# Patient Record
Sex: MALE | Race: WHITE | Employment: FULL TIME | URBAN - NONMETROPOLITAN AREA
[De-identification: names, ages, dates, MRNs, and addresses within clinical notes are randomized per-mention and may not be internally consistent; named-entity substitution may affect disease eponyms.]

---

## 2020-10-05 ENCOUNTER — OFFICE VISIT (OUTPATIENT)
Dept: CARDIOLOGY CLINIC | Age: 65
End: 2020-10-05
Payer: COMMERCIAL

## 2020-10-05 VITALS
DIASTOLIC BLOOD PRESSURE: 86 MMHG | HEIGHT: 76 IN | SYSTOLIC BLOOD PRESSURE: 138 MMHG | BODY MASS INDEX: 34.34 KG/M2 | HEART RATE: 44 BPM | WEIGHT: 282 LBS

## 2020-10-05 PROBLEM — R00.1 BRADYCARDIA: Status: ACTIVE | Noted: 2020-10-05

## 2020-10-05 PROBLEM — Z87.898 HISTORY OF SYNCOPE: Status: ACTIVE | Noted: 2020-10-05

## 2020-10-05 PROBLEM — I45.2 RBBB (RIGHT BUNDLE BRANCH BLOCK WITH LEFT ANTERIOR FASCICULAR BLOCK): Status: ACTIVE | Noted: 2020-10-05

## 2020-10-05 PROBLEM — I44.1 AV BLOCK, 2ND DEGREE: Status: ACTIVE | Noted: 2020-10-05

## 2020-10-05 PROBLEM — R42 POSTURAL DIZZINESS: Status: ACTIVE | Noted: 2020-10-05

## 2020-10-05 PROBLEM — I10 ESSENTIAL HYPERTENSION: Status: ACTIVE | Noted: 2020-10-05

## 2020-10-05 PROBLEM — E78.00 PURE HYPERCHOLESTEROLEMIA: Status: ACTIVE | Noted: 2020-10-05

## 2020-10-05 PROCEDURE — G8427 DOCREV CUR MEDS BY ELIG CLIN: HCPCS | Performed by: INTERNAL MEDICINE

## 2020-10-05 PROCEDURE — 3017F COLORECTAL CA SCREEN DOC REV: CPT | Performed by: INTERNAL MEDICINE

## 2020-10-05 PROCEDURE — 99204 OFFICE O/P NEW MOD 45 MIN: CPT | Performed by: INTERNAL MEDICINE

## 2020-10-05 PROCEDURE — 93000 ELECTROCARDIOGRAM COMPLETE: CPT | Performed by: INTERNAL MEDICINE

## 2020-10-05 PROCEDURE — G8484 FLU IMMUNIZE NO ADMIN: HCPCS | Performed by: INTERNAL MEDICINE

## 2020-10-05 PROCEDURE — G8417 CALC BMI ABV UP PARAM F/U: HCPCS | Performed by: INTERNAL MEDICINE

## 2020-10-05 PROCEDURE — 1036F TOBACCO NON-USER: CPT | Performed by: INTERNAL MEDICINE

## 2020-10-05 RX ORDER — ATORVASTATIN CALCIUM 20 MG/1
20 TABLET, FILM COATED ORAL DAILY
COMMUNITY
Start: 2020-10-03

## 2020-10-05 RX ORDER — LISINOPRIL 5 MG/1
5 TABLET ORAL DAILY
COMMUNITY
Start: 2020-09-28 | End: 2022-10-25

## 2020-10-05 NOTE — PROGRESS NOTES
Chief Complaint   Patient presents with    New Patient    Bradycardia   New patient referred for bradycardia. EKG done today. Denied cp, palpitation or edema    Sob on exertion- in the last few months    Occasional dizziness on getting up fast- for years    Had syncope-late July 2020  predrome-NOTHING SIGNIFICANT  VERY SHORT LASTING dizziness  NO Injury  Did not seek medical advise    postdrome- NONE  No symptom of nause, or sweating or feeling tired  WAKE UP FROM SYNCOPE AND BACK TO BASELINE  Mid august slipped and fall with no LOC, was on pain medication    Hx of Bradycardia and rate of 33  BPM noted at a home    Had cardiac cath in [place] 5 yrs back and known to have LBBB then as well    nevere smoked    Factor V lieden and Hx of DVT 10 yrs back- and on [facility]  Mother had CHF,  None for cAD or pacer need    Patient Active Problem List   Diagnosis    AV block, 2nd degree with 2: 1 AV block    RBBB (right bundle branch block with left anterior fascicular block)    Bradycardia    History of syncope    Postural dizziness    Essential hypertension    Pure hypercholesterolemia       Past Surgical History:   Procedure Laterality Date    GALLBLADDER SURGERY      PROSTATE SURGERY  2015       No Known Allergies     History reviewed. No pertinent family history.      Social History     Socioeconomic History    Marital status: Unknown     Spouse name: Not on file    Number of children: Not on file    Years of education: Not on file    Highest education level: Not on file   Occupational History    Not on file   Social Needs    Financial resource strain: Not on file    Food insecurity     Worry: Not on file     Inability: Not on file    Transportation needs     Medical: Not on file     Non-medical: Not on file   Tobacco Use    Smoking status: Never Smoker    Smokeless tobacco: Never Used   Substance and Sexual Activity    Alcohol use: Not on file    Drug use: Not on file    Sexual activity: Not on file   Lifestyle    Physical activity     Days per week: Not on file     Minutes per session: Not on file    Stress: Not on file   Relationships    Social connections     Talks on phone: Not on file     Gets together: Not on file     Attends [employer] service: Not on file     Active member of club or organization: Not on file     Attends meetings of clubs or organizations: Not on file     Relationship status: Not on file    Intimate partner violence     Fear of current or ex partner: Not on file     Emotionally abused: Not on file     Physically abused: Not on file     Forced sexual activity: Not on file   Other Topics Concern    Not on file   Social History Narrative    Not on file       Current Outpatient Medications   Medication Sig Dispense Refill    rivaroxaban (XARELTO) 20 MG TABS tablet Take by mouth      atorvastatin (LIPITOR) 20 MG tablet       lisinopril (PRINIVIL;ZESTRIL) 5 MG tablet Take by mouth       No current facility-administered medications for this visit. Review of Systems -     General ROS: negative  Psychological ROS: negative  Hematological and Lymphatic ROS: No history of blood clots or bleeding disorder. Respiratory ROS: no cough,  or wheezing, the rest see HPI  Cardiovascular ROS: See HPI  Gastrointestinal ROS: negative  Genito-Urinary ROS: no dysuria, trouble voiding, or hematuria  Musculoskeletal ROS: negative  Neurological ROS: no TIA or stroke symptoms  Dermatological ROS: negative      Blood pressure 138/86, pulse (!) 44, height 6' 4\" (1.93 m), weight 282 lb (127.9 kg).         Physical Examination:    General appearance - alert, well appearing, and in no distress  HEENT- Pink conjunctiva  , Non-icteri sclera,PERRLA  Mental status - alert, oriented to person, place, and time  Neck - supple, no significant adenopathy, no JVD, or carotid bruits  Chest - clear to auscultation, no wheezes, rales or rhonchi, symmetric air entry  Heart - normal rate, regular rhythm, normal S1, S2, no murmurs, rubs, clicks or gallops  Abdomen - soft, nontender, nondistended, no masses or organomegaly  TERRY- no CVA or flank tenderness, no suprapubic tenderness  Neurological - alert, oriented, normal speech, no focal findings or movement disorder noted  Musculoskeletal/limbs - no joint tenderness, deformity or swelling   - peripheral pulses normal, no pedal edema, no clubbing or cyanosis  Skin - normal coloration and turgor, no rashes, no suspicious skin lesions noted  Psych- appropriate mood and affect    Lab  No results for input(s): CKTOTAL, CKMB, CKMBINDEX, TROPONINI in the last 72 hours. CBC: No results found for: WBC, RBC, HGB, HCT, MCV, MCH, MCHC, RDW, PLT, MPV  BMP:  No results found for: NA, K, CL, CO2, BUN, LABALBU, CREATININE, CALCIUM, GFRAA, LABGLOM, GLUCOSE  Hepatic Function Panel:  No results found for: ALKPHOS, ALT, AST, PROT, BILITOT, BILIDIR, IBILI, LABALBU  Magnesium:  No results found for: MG  Warfarin PT/INR:  No components found for: PTPATWAR, PTINRWAR  HgBA1c:  No results found for: LABA1C  FLP:  No results found for: TRIG, HDL, LDLCALC, LDLDIRECT, LABVLDL  TSH:  No results found for: TSH      ekg 10/5/2020  NSR, 2: 1 AV block, RBBB, RUTHANN, Bifascicular block, QRS duration 166 msec    Assessment    SYMPTOMATIC bradyarrhythmia WITH 2ND DEGREE av BLOCK AND BIFASCICULAR BLOCK) AND RECENT HX OF SYNCOPE AND INTERMITTENT DIZZINESS      Diagnosis Orders   1. AV block, 2nd degree with 2: 1 AV block  ECHO Complete 2D W Doppler W Color    INSERT / REPLACE / REMOVE PACEMAKER   2. RBBB (right bundle branch block with left anterior fascicular block)  ECHO Complete 2D W Doppler W Color    INSERT / REPLACE / REMOVE PACEMAKER   3. Bradycardia  EKG 12 Lead    ECHO Complete 2D W Doppler W Color    INSERT / REPLACE / REMOVE PACEMAKER   4. History of syncope  ECHO Complete 2D W Doppler W Color    INSERT / REPLACE / REMOVE PACEMAKER   5.  Postural dizziness  ECHO Complete 2D W Doppler W Color

## 2020-10-06 ENCOUNTER — HOSPITAL ENCOUNTER (OUTPATIENT)
Dept: NON INVASIVE DIAGNOSTICS | Age: 65
Discharge: HOME | End: 2020-10-06
Payer: COMMERCIAL

## 2020-10-06 LAB
LV EF: 60 %
LVEF MODALITY: NORMAL

## 2020-10-06 PROCEDURE — 93306 TTE W/DOPPLER COMPLETE: CPT

## 2020-10-08 ENCOUNTER — HOSPITAL ENCOUNTER (OUTPATIENT)
Dept: INPATIENT UNIT | Age: 65
Discharge: HOME | End: 2020-10-08
Attending: INTERNAL MEDICINE | Admitting: INTERNAL MEDICINE
Payer: COMMERCIAL

## 2020-10-08 ENCOUNTER — APPOINTMENT (OUTPATIENT)
Dept: GENERAL RADIOLOGY | Age: 65
End: 2020-10-08
Attending: INTERNAL MEDICINE
Payer: COMMERCIAL

## 2020-10-08 VITALS
HEIGHT: 76 IN | SYSTOLIC BLOOD PRESSURE: 159 MMHG | BODY MASS INDEX: 34.1 KG/M2 | WEIGHT: 280 LBS | HEART RATE: 70 BPM | OXYGEN SATURATION: 95 % | DIASTOLIC BLOOD PRESSURE: 78 MMHG | TEMPERATURE: 97.9 F | RESPIRATION RATE: 16 BRPM

## 2020-10-08 LAB
ANION GAP SERPL CALCULATED.3IONS-SCNC: 8 MEQ/L (ref 8–16)
APTT: 30.7 SECONDS (ref 22–38)
BUN BLDV-MCNC: 21 MG/DL (ref 7–22)
CALCIUM SERPL-MCNC: 9.6 MG/DL (ref 8.5–10.5)
CHLORIDE BLD-SCNC: 106 MEQ/L (ref 98–111)
CO2: 27 MEQ/L (ref 23–33)
CREAT SERPL-MCNC: 1.1 MG/DL (ref 0.4–1.2)
EKG ATRIAL RATE: 60 BPM
EKG P AXIS: 41 DEGREES
EKG P-R INTERVAL: 180 MS
EKG Q-T INTERVAL: 538 MS
EKG QRS DURATION: 174 MS
EKG QTC CALCULATION (BAZETT): 392 MS
EKG R AXIS: -65 DEGREES
EKG T AXIS: -40 DEGREES
EKG VENTRICULAR RATE: 32 BPM
ERYTHROCYTE [DISTWIDTH] IN BLOOD BY AUTOMATED COUNT: 14.4 % (ref 11.5–14.5)
ERYTHROCYTE [DISTWIDTH] IN BLOOD BY AUTOMATED COUNT: 45.4 FL (ref 35–45)
GFR SERPL CREATININE-BSD FRML MDRD: 68 ML/MIN/1.73M2
GLUCOSE BLD-MCNC: 103 MG/DL (ref 70–108)
HCT VFR BLD CALC: 49.4 % (ref 42–52)
HEMOGLOBIN: 15.5 GM/DL (ref 14–18)
INR BLD: 1.02 (ref 0.85–1.13)
MCH RBC QN AUTO: 27.2 PG (ref 26–33)
MCHC RBC AUTO-ENTMCNC: 31.4 GM/DL (ref 32.2–35.5)
MCV RBC AUTO: 86.7 FL (ref 80–94)
PLATELET # BLD: 209 THOU/MM3 (ref 130–400)
PMV BLD AUTO: 10.8 FL (ref 9.4–12.4)
POTASSIUM REFLEX MAGNESIUM: 5 MEQ/L (ref 3.5–5.2)
RBC # BLD: 5.7 MILL/MM3 (ref 4.7–6.1)
SODIUM BLD-SCNC: 141 MEQ/L (ref 135–145)
WBC # BLD: 7.7 THOU/MM3 (ref 4.8–10.8)

## 2020-10-08 PROCEDURE — 71045 X-RAY EXAM CHEST 1 VIEW: CPT

## 2020-10-08 PROCEDURE — 85730 THROMBOPLASTIN TIME PARTIAL: CPT

## 2020-10-08 PROCEDURE — 2500000003 HC RX 250 WO HCPCS

## 2020-10-08 PROCEDURE — 6360000002 HC RX W HCPCS

## 2020-10-08 PROCEDURE — C1898 LEAD, PMKR, OTHER THAN TRANS: HCPCS

## 2020-10-08 PROCEDURE — 85610 PROTHROMBIN TIME: CPT

## 2020-10-08 PROCEDURE — 6360000002 HC RX W HCPCS: Performed by: INTERNAL MEDICINE

## 2020-10-08 PROCEDURE — 2709999900 HC NON-CHARGEABLE SUPPLY

## 2020-10-08 PROCEDURE — 2580000003 HC RX 258: Performed by: INTERNAL MEDICINE

## 2020-10-08 PROCEDURE — 36415 COLL VENOUS BLD VENIPUNCTURE: CPT

## 2020-10-08 PROCEDURE — 80048 BASIC METABOLIC PNL TOTAL CA: CPT

## 2020-10-08 PROCEDURE — C1785 PMKR, DUAL, RATE-RESP: HCPCS

## 2020-10-08 PROCEDURE — 85027 COMPLETE CBC AUTOMATED: CPT

## 2020-10-08 PROCEDURE — 93010 ELECTROCARDIOGRAM REPORT: CPT | Performed by: INTERNAL MEDICINE

## 2020-10-08 PROCEDURE — C1894 INTRO/SHEATH, NON-LASER: HCPCS

## 2020-10-08 PROCEDURE — 93005 ELECTROCARDIOGRAM TRACING: CPT | Performed by: INTERNAL MEDICINE

## 2020-10-08 PROCEDURE — 33208 INSRT HEART PM ATRIAL & VENT: CPT | Performed by: INTERNAL MEDICINE

## 2020-10-08 RX ORDER — SODIUM CHLORIDE 9 MG/ML
INJECTION, SOLUTION INTRAVENOUS CONTINUOUS
Status: DISCONTINUED | OUTPATIENT
Start: 2020-10-08 | End: 2020-10-08 | Stop reason: HOSPADM

## 2020-10-08 RX ORDER — SODIUM CHLORIDE 0.9 % (FLUSH) 0.9 %
10 SYRINGE (ML) INJECTION PRN
Status: DISCONTINUED | OUTPATIENT
Start: 2020-10-08 | End: 2020-10-08 | Stop reason: HOSPADM

## 2020-10-08 RX ORDER — SODIUM CHLORIDE 0.9 % (FLUSH) 0.9 %
10 SYRINGE (ML) INJECTION EVERY 12 HOURS SCHEDULED
Status: CANCELLED | OUTPATIENT
Start: 2020-10-08

## 2020-10-08 RX ORDER — VITAMIN B COMPLEX
2 CAPSULE ORAL DAILY
COMMUNITY

## 2020-10-08 RX ORDER — OXYCODONE HYDROCHLORIDE 5 MG/1
5 TABLET ORAL EVERY 4 HOURS PRN
Status: DISCONTINUED | OUTPATIENT
Start: 2020-10-08 | End: 2020-10-08 | Stop reason: HOSPADM

## 2020-10-08 RX ORDER — OXYCODONE HYDROCHLORIDE 5 MG/1
10 TABLET ORAL EVERY 4 HOURS PRN
Status: DISCONTINUED | OUTPATIENT
Start: 2020-10-08 | End: 2020-10-08 | Stop reason: HOSPADM

## 2020-10-08 RX ORDER — ONDANSETRON 2 MG/ML
4 INJECTION INTRAMUSCULAR; INTRAVENOUS EVERY 6 HOURS PRN
Status: DISCONTINUED | OUTPATIENT
Start: 2020-10-08 | End: 2020-10-08 | Stop reason: HOSPADM

## 2020-10-08 RX ORDER — ACETAMINOPHEN 325 MG/1
650 TABLET ORAL EVERY 4 HOURS PRN
Status: DISCONTINUED | OUTPATIENT
Start: 2020-10-08 | End: 2020-10-08 | Stop reason: HOSPADM

## 2020-10-08 RX ADMIN — SODIUM CHLORIDE: 9 INJECTION, SOLUTION INTRAVENOUS at 12:50

## 2020-10-08 RX ADMIN — CEFAZOLIN 3 G: 10 INJECTION, POWDER, FOR SOLUTION INTRAVENOUS at 15:00

## 2020-10-08 NOTE — PROGRESS NOTES
Pt admitted to  2E05 ambulatory for permanent pacemaker. Pt NPO. Patient accompanied by wife. Vital signs obtained. Assessment and data collection initiated. Oriented to room. Policies and procedures for 2E explained   All questions answered with no further questions at this time. Fall prevention and safety precautions discussed with patient.

## 2020-10-08 NOTE — POST SEDATION
Sedation Post Procedure Note    Patient Name: Antony Esquivel   YOB: 1955  Room/Bed: [facility]005-A  Medical Record Number: [id number]  Date: 10/8/2020   Time: 3:47 PM         Physicians/Assistants: Derek Bell MD    Procedure Performed:  DDD PPM implant    Post-Sedation Vital Signs:  Vitals:    10/08/20 1227   BP: 124/70   Pulse: (!) 32   Resp: 13   Temp: 97.9 °F (36.6 °C)   SpO2: 98%      Vital signs were reviewed and were stable after the procedure (see flow sheet for vitals)            Post-Sedation Exam: Lungs: clear and Cardiovascular: regular rate and rhythm           Complications: none    Electronically signed by Derek Bell MD on 10/8/2020 at 3:47 PM

## 2020-10-08 NOTE — H&P
[facility]  Sedation/Analgesia History & Physical    Pt Name: Jacey Shell  Account number: [de-identified]  MRN: [id number]  YOB: 1955  Provider Performing Procedure: Aurelio Gardner MD  Referring Provider: Charles Marcus MD   Primary Care Physician: Ghada Eisenberg MD  Date: 10/8/2020    PRE-PROCEDURE    Code Status: FULL CODE  Brief History/Pre-Procedure Diagnosis: 65yo with history of factor V Leiden deficiency, DVT and hypertension who has had symptoms of intermittent lightheadedness. He was found to be in Second Degree 2:1 AV block and presents now for DDD PPM implant. Consent: : I have discussed with the patient risks, benefits, and alternatives (and relevant risks, benefits, and side effects related to alternatives or not receiving care), and likelihood of the success. The patient and/or representative appear to understand and agree to proceed. The discussion encompasses risks, benefits, and side effects related to the alternatives and the risks related to not receiving the proposed care, treatment, and services. The indication, risks and benefits of the procedure and possible therapeutic consequences and alternatives were discussed with the patient. The patient was given the opportunity to ask questions and to have them answered to his/her satisfaction.  Risks of the procedure include but are not limited to the following: Bleeding, hematoma including retroperitoneal hemmorhage, infection, pain and discomfort, injury to the aorta and other blood vessels, rhythm disturbance, low blood pressure, myocardial infarction, stroke, kidney damage/failure, myocardial perforation, allergic reactions to sedatives/contrast material, loss of pulse/vascular compromise requiring surgery, aneurysm/pseudoaneurysm formation, possible loss of a limb/hand/leg, needing blood transfusion, requiring emergent open heart surgery or emergent coronary intervention, the need for intubation/respiratory support, the requirement for defibrillation/cardioversion, and death. Alternatives to and omission of the suggested procedure were discussed. The patient had no further questions and wished to proceed; the consent form was signed. MEDICAL HISTORY  []ASHD/ANGINA/MI/CHF   [x]Hypertension  []Diabetes  []Hyperlipidemia  []Smoking  []Family Hx of ASHD  [x]Additional information:       has a past medical history of Arrhythmia, DVT (deep venous thrombosis) ([facility] [address] Factor V deficiency ([facility] [address] Hyperlipidemia, Hypertension, and Prostate cancer ([facility] [address] SURGICAL HISTORY   has a past surgical history that includes Gallbladder surgery; Prostate surgery (2015); and Cholecystectomy. Additional information:       ALLERGIES   Allergies as of 10/08/2020    (No Known Allergies)     Additional information:       MEDICATIONS   Aspirin  [] 81 mg  [] 325 mg  [x] None  Antiplatelet drug therapy use last 5 days  [x]No []Yes  Coumadin Use Last 5 Days [x]No []Yes  Other anticoagulant use last 5 days  []No [x]Yes    Current Facility-Administered Medications:     0.9 % sodium chloride infusion, , Intravenous, Continuous, Jessica Day MD, Last Rate: 75 mL/hr at 10/08/20 1250    ceFAZolin (ANCEF) 3 g in dextrose 5 % 100 mL IVPB, 3 g, Intravenous, On Call to Surya Landeros MD  Prior to Admission medications    Medication Sig Start Date End Date Taking?  Authorizing Provider   b complex vitamins capsule Take 2 capsules by mouth daily   Yes Historical Provider, MD   Flaxseed, Linseed, (FLAX SEEDS PO) Take 2 tablets by mouth daily   Yes Historical Provider, MD   atorvastatin (LIPITOR) 20 MG tablet Take 20 mg by mouth daily  10/3/20  Yes Historical Provider, MD   lisinopril (PRINIVIL;ZESTRIL) 5 MG tablet Take 5 mg by mouth daily  9/28/20 3/26/21 Yes Historical Provider, MD   rivaroxaban (XARELTO) 20 MG TABS tablet Take by mouth daily  9/28/20 3/26/21  Historical Provider, MD Petersen information:       VITAL SIGNS   Vitals:    10/08/20 1227   BP: 124/70   Pulse: (!) 32   Resp: 13   Temp: 97.9 °F (36.6 °C)   SpO2: 98%       PHYSICAL:   General: No acute distress  HEENT:  Unremarkable for age  Neck: without increased JVD, carotid pulses 2+ bilaterally without bruits  Heart: RRR, S1 & S2 WNL, S4 gallop, without murmurs or rubs   NYHA: II   Lungs: Clear to auscultation    Abdomen: BS present, without HSM, masses, or tenderness    Extremities: without C,C,E.  Pulses 2+ bilaterally  Mental Status: Alert & Oriented        PLANNED PROCEDURE   []Cath  []PCI                [x]Pacemaker/AICD  []ASHLEY             []Cardioversion []Peripheral angiography/PTA  []Other:      SEDATION  Planned agent:[x]Midazolam []Meperidine [x]Sublimaze []Morphine  []Diazepam  []Other:     ASA Classification:  []1 [x]2 []3 []4 []5  Class 1: A normal healthy patient  Class 2: Pt with mild to moderate systemic disease  Class 3: Severe systemic disease or disturbance  Class 4: Severe systemic disorders that are already life threatening. Class 5: Moribund pt with little chances of survival, for more than 24 hours. Mallampati I Airway Classification:   []1 []2 [x]3 []4    [x]Pre-procedure diagnostic studies complete and results available. Comment:    [x]Previous sedation/anesthesia experiences assessed. Comment:    [x]The patient is an appropriate candidate to undergo the planned procedure sedation and anesthesia. (Refer to nursing sedation/analgesia documentation record)  [x]Formulation and discussion of sedation/procedure plan, risks, and expectations with patient and/or responsible adult completed. [x]Patient examined immediately prior to the procedure.  (Refer to nursing sedation/analgesia documentation record)    Taylor Knutson MD   Electronically signed 10/8/2020 at 2:39 PM

## 2020-10-08 NOTE — PROCEDURES
[address] [id number]                            CARDIAC CATHETERIZATION    PATIENT NAME: Anuja Redd                  :        1955  MED REC NO:   [id number]                           ROOM:       0005  ACCOUNT NO:   [de-identified]                           ADMIT DATE: 10/08/2020  PROVIDER:     Elizabethann Bottom. Molly Kussmaul, M.D.    Malcolmargelia Taylor:  10/08/2020    REFERRING PROVIDER:  Venancio Antoine MD    CLINICAL HISTORY:  60-year-old man with a history of hypertension,  Factor V Leiden deficiency, DVT, intermittent lightheadedness as well as  syncope. He was found recently to have second-degree heart block with  2:1 AV conduction. For this reason he presents today for implantation  of a dual-chamber pacemaker for symptomatic AV block. DESCRIPTION OF PROCEDURE:  After informed consent was obtained, the  patient was brought to the electrophysiology laboratory at [facility] in the fasting state. He was given intravenous Versed  and fentanyl for conscious sedation. 20 mL of 2% lidocaine was  infiltrated locally at the anticipated left pectoral incision site. A  2-inch incision was created with subsequent electrocautery and blunt  dissection to reach the prepectoral fascia where a pocket was created  with a finger sweep maneuver. Intravenous access was easily achieved  using an axillary vein approach with fluoroscopic guidance. Two  separate guidewires were deployed for subsequent sheath insertion. A  7-Lao peel-away hemostatic sheath was inserted over the first wire. The sheath was used to introduce a new Medtronic right ventricular  pacing lead model [id number], serial [id number]. This lead was placed to  the mid right ventricular septum with sensed electrograms of 6.2 mV and  a capture threshold of 0.75 volts at 0.4 milliseconds with a pacing  impedance of 988 ohms.   Once the lead was in situ, the sheath was slit  and pulled from the insertion site. The second guidewire was used to  introduce a second 7-German peel-away sheath. This second sheath was  used to introduce a new right atrial lead. The right atrial lead was a  Medtronic model [id number], serial [id number]. This lead was advanced  under fluoroscopic guidance to the right atrial appendage, where it was  secured by activating the distal helix. Intraoperative testing  demonstrated sensed electrograms of 3.9 mV with a capture threshold of  1.2 volts at 0.5 milliseconds with a pacing impedance of 731 ohms. Once  the right atrial lead was in situ, the sheath was slit removed from the  insertion site. We then advanced both suture sleeves to the puncture  site and secured the leads with soft silk 2-0 ligature. Two ligatures  were applied to each tie down which were subsequently attached to the  pectoralis muscle. Inner stylets were removed and a new pacemaker  introduced to the leads. The pacemaker was a Medtronic Lona XT DR MRI,  model [id number], serial [id number]. Each lead pin was placed in its  respective port of the pulse generator header and secured with a header  set screw. A gentle tug test on each pin confirmed they are well seated  in the header. We then wrapped lead slack around the pulse generator  and placed it posteriorly in the pocket behind the generator. The  header was secured with a single soft silk 2-0 suture to the pectoralis  muscle. Pocket was irrigated with antibiotic solution and closed with  sequential layers of Vicryl 2-0, Vicryl 2-0, and Vicryl 4-0 sutures. A  layer of Steri-Strips was applied using benzoin adhesive. Estimated  blood loss was 20 mL. There was no apparent complication. CONCLUSIONS:  1. Successful dual-chamber permanent pacemaker implant. 2.  Excellent intraoperative sensing and capture thresholds observed. 3.  Normal dual-chamber pacing function postoperatively. RECOMMENDATIONS:  1. Routine postoperative wound care. 2.  Chest x-ray examination. 3.  Discharge home later today. 4.  Resume anticoagulation therapy in 24 hours. 5.  Follow up with Dr. Christelle Webster routinely.         Filemon Aguirre M.D.    D: 10/08/2020 16:11:35       T: 10/08/2020 16:21:18     [email or web address]  Job#: [id number]     Doc#: [id number]    CC:

## 2020-10-08 NOTE — PROGRESS NOTES
Pt assisted to [facility] to vd  Amb in [place] - pt denies any dizziness or lightheadedness  Ret'd to room, resting in chair  Pt denies any pain

## 2020-10-08 NOTE — PROGRESS NOTES
Spoke with Silvio manzanares, regarding a device check after getting pt up and prior to discharge - she states that a device check is not routinely done on new device prior to discharge

## 2020-10-09 PROCEDURE — 2709999900 HC NON-CHARGEABLE SUPPLY

## 2020-10-09 PROCEDURE — C1898 LEAD, PMKR, OTHER THAN TRANS: HCPCS

## 2020-10-09 PROCEDURE — C1894 INTRO/SHEATH, NON-LASER: HCPCS

## 2020-10-09 PROCEDURE — C1785 PMKR, DUAL, RATE-RESP: HCPCS

## 2020-10-14 ENCOUNTER — NURSE ONLY (OUTPATIENT)
Dept: CARDIOLOGY CLINIC | Age: 65
End: 2020-10-14
Payer: COMMERCIAL

## 2020-10-14 PROCEDURE — 93280 PM DEVICE PROGR EVAL DUAL: CPT | Performed by: INTERNAL MEDICINE

## 2020-10-14 NOTE — PROGRESS NOTES
Novapost dual pacer  Battery 11years  A paced 17.1%  V paced 95.5%  p wave 4.5mV  r wave 12.9mV  Atrial threshold 1.0V @ 0.4ms  Ventricle threshold 1.0V @0.4ms  Atrial impedance 494 ohms  Ventricle impedance 494 ohms  Atrial sensitivity changed to 0.45, due to sensing to much on atrial channel

## 2020-11-09 ENCOUNTER — OFFICE VISIT (OUTPATIENT)
Dept: CARDIOLOGY CLINIC | Age: 65
End: 2020-11-09
Payer: COMMERCIAL

## 2020-11-09 VITALS
SYSTOLIC BLOOD PRESSURE: 128 MMHG | HEART RATE: 43 BPM | WEIGHT: 278 LBS | DIASTOLIC BLOOD PRESSURE: 76 MMHG | HEIGHT: 76 IN | BODY MASS INDEX: 33.85 KG/M2

## 2020-11-09 PROBLEM — R06.02 SOB (SHORTNESS OF BREATH) ON EXERTION: Status: ACTIVE | Noted: 2020-11-09

## 2020-11-09 PROBLEM — Z95.0 S/P CARDIAC PACEMAKER PROCEDURE: Status: ACTIVE | Noted: 2020-11-09

## 2020-11-09 PROBLEM — R94.31 ABNORMAL EKG: Status: ACTIVE | Noted: 2020-11-09

## 2020-11-09 PROCEDURE — G8427 DOCREV CUR MEDS BY ELIG CLIN: HCPCS | Performed by: INTERNAL MEDICINE

## 2020-11-09 PROCEDURE — G8484 FLU IMMUNIZE NO ADMIN: HCPCS | Performed by: INTERNAL MEDICINE

## 2020-11-09 PROCEDURE — G8417 CALC BMI ABV UP PARAM F/U: HCPCS | Performed by: INTERNAL MEDICINE

## 2020-11-09 PROCEDURE — 1036F TOBACCO NON-USER: CPT | Performed by: INTERNAL MEDICINE

## 2020-11-09 PROCEDURE — 3017F COLORECTAL CA SCREEN DOC REV: CPT | Performed by: INTERNAL MEDICINE

## 2020-11-09 PROCEDURE — 99214 OFFICE O/P EST MOD 30 MIN: CPT | Performed by: INTERNAL MEDICINE

## 2020-11-09 NOTE — PROGRESS NOTES
Chief Complaint   Patient presents with    Check-Up     AV block 2 nd degree with 2:1 AV block    Bradycardia   Originally  patient referred for bradycardia. And need pacer and ha d pacer already    Pt here for 1 mo check up fu pacemaker     Denied cp, palpitation or edema    Sob on exertion- in the last few months prior to pacer and a little better    Occasional dizziness on getting up fast- for years\alex after pacer    Had syncope-late July 2020  predrome-NOTHING SIGNIFICANT  VERY SHORT LASTING dizziness  NO Injury  Did not seek medical advise    postdrome- NONE  No symptom of nause, or sweating or feeling tired  WAKE UP FROM SYNCOPE AND BACK TO BASELINE  Mid august slipped and fall with no LOC, was on pain medication    Hx of Bradycardia and rate of 33  BPM noted at a home    Had cardiac cath in [place] 5 yrs back and known to have LBBB then as well    nevere smoked    Factor V lieden and Hx of DVT 10 yrs back- and on [facility]  Mother had CHF,  None for cAD or pacer need    Patient Active Problem List   Diagnosis    AV block, 2nd degree with 2: 1 AV block    RBBB (right bundle branch block with left anterior fascicular block)    Bradycardia    History of syncope    Postural dizziness    Essential hypertension    Pure hypercholesterolemia    S/P cardiac pacemaker procedure 10/2020    SOB (shortness of breath) on exertion    Abnormal EKG       Past Surgical History:   Procedure Laterality Date    CARDIAC PACEMAKER PLACEMENT  10/08/2020    CHOLECYSTECTOMY      GALLBLADDER SURGERY      PROSTATE SURGERY  2015       No Known Allergies     History reviewed. No pertinent family history.      Social History     Socioeconomic History    Marital status:      Spouse name: Mike King Number of children: 2    Years of education: 12 years    Highest education level: Not on file   Occupational History    Not on file   Social Needs    Financial resource strain: Not on file   Octavio-Dinah insecurity Worry: Not on file     Inability: Not on file    Transportation needs     Medical: Not on file     Non-medical: Not on file   Tobacco Use    Smoking status: Never Smoker    Smokeless tobacco: Never Used   Substance and Sexual Activity    Alcohol use: Yes     Comment: socially    Drug use: Never    Sexual activity: Not on file   Lifestyle    Physical activity     Days per week: Not on file     Minutes per session: Not on file    Stress: Not on file   Relationships    Social connections     Talks on phone: Not on file     Gets together: Not on file     Attends Zoroastrian service: Not on file     Active member of club or organization: Not on file     Attends meetings of clubs or organizations: Not on file     Relationship status: Not on file    Intimate partner violence     Fear of current or ex partner: Not on file     Emotionally abused: Not on file     Physically abused: Not on file     Forced sexual activity: Not on file   Other Topics Concern    Not on file   Social History Narrative    Not on file       Current Outpatient Medications   Medication Sig Dispense Refill    b complex vitamins capsule Take 2 capsules by mouth daily      Flaxseed, Linseed, (FLAX SEEDS PO) Take 2 tablets by mouth daily      rivaroxaban (XARELTO) 20 MG TABS tablet Take by mouth daily       atorvastatin (LIPITOR) 20 MG tablet Take 20 mg by mouth daily       lisinopril (PRINIVIL;ZESTRIL) 5 MG tablet Take 5 mg by mouth daily        No current facility-administered medications for this visit. Review of Systems -     General ROS: negative  Psychological ROS: negative  Hematological and Lymphatic ROS: No history of blood clots or bleeding disorder.    Respiratory ROS: no cough,  or wheezing, the rest see HPI  Cardiovascular ROS: See HPI  Gastrointestinal ROS: negative  Genito-Urinary ROS: no dysuria, trouble voiding, or hematuria  Musculoskeletal ROS: negative  Neurological ROS: no TIA or stroke symptoms  Dermatological ROS: negative      Blood pressure 128/76, pulse (!) 43, height 6' 4\" (1.93 m), weight 278 lb (126.1 kg). Physical Examination:    General appearance - alert, well appearing, and in no distress  HEENT- Pink conjunctiva  , Non-icteri sclera,PERRLA  Mental status - alert, oriented to person, place, and time  Neck - supple, no significant adenopathy, no JVD, or carotid bruits  Chest - clear to auscultation, no wheezes, rales or rhonchi, symmetric air entry  Heart - normal rate, regular rhythm, normal S1, S2, no murmurs, rubs, clicks or gallops  Abdomen - soft, nontender, nondistended, no masses or organomegaly  TERRY- no CVA or flank tenderness, no suprapubic tenderness  Neurological - alert, oriented, normal speech, no focal findings or movement disorder noted  Musculoskeletal/limbs - no joint tenderness, deformity or swelling   - peripheral pulses normal, no pedal edema, no clubbing or cyanosis  Skin - normal coloration and turgor, no rashes, no suspicious skin lesions noted  Psych- appropriate mood and affect    Lab  No results for input(s): CKTOTAL, CKMB, CKMBINDEX, TROPONINI in the last 72 hours.   CBC:   Lab Results   Component Value Date    WBC 7.7 10/08/2020    RBC 5.70 10/08/2020    HGB 15.5 10/08/2020    HCT 49.4 10/08/2020    MCV 86.7 10/08/2020    MCH 27.2 10/08/2020    MCHC 31.4 10/08/2020     10/08/2020    MPV 10.8 10/08/2020     BMP:    Lab Results   Component Value Date     10/08/2020    K 5.0 10/08/2020     10/08/2020    CO2 27 10/08/2020    BUN 21 10/08/2020    CREATININE 1.1 10/08/2020    CALCIUM 9.6 10/08/2020    LABGLOM 68 10/08/2020    GLUCOSE 103 10/08/2020     Hepatic Function Panel:  No results found for: ALKPHOS, ALT, AST, PROT, BILITOT, BILIDIR, IBILI, LABALBU  Magnesium:  No results found for: MG  Warfarin PT/INR:  No components found for: PTPATWAR, PTINRWAR  HgBA1c:  No results found for: LABA1C  FLP:  No results found for: TRIG, HDL, LDLCALC, LDLDIRECT, LABVLDL  TSH:  No results found for: TSH      ekg 10/5/2020  NSR, 2: 1 AV block, RBBB, RUTHANN, Bifascicular block, QRS duration 166 msec      Echo   Conclusions      Summary   Left Ventricular size is Mildly increased . Normal left ventricular wall thickness. Systolic function was normal.   There were no regional wall motion abnormalities. Ejection fraction is visually estimated at 60%. Doppler parameters were consistent with abnormal left ventricular   relaxation (grade 1 diastolic dysfunction). The left atrium is Mildly dilated. Signature      ----------------------------------------------------------------   Electronically signed by Caesar Márquez MD (Interpreting   physician) on 10/06/2020 at 06:03 PM   ----------------------------------------------------------------      Assessment    SYMPTOMATIC bradyarrhythmia WITH 2ND DEGREE av BLOCK AND BIFASCICULAR BLOCK) AND RECENT HX OF SYNCOPE AND INTERMITTENT DIZZINESS      Diagnosis Orders   1. Essential hypertension  Stress test (Lexiscan)    Holter monitor 48 hour   2. Pure hypercholesterolemia  Stress test (Lexiscan)    Holter monitor 48 hour   3. SOB (shortness of breath) on exertion  Stress test (Lexiscan)    Holter monitor 48 hour   4. Postural dizziness  Stress test (Lexiscan)    Holter monitor 48 hour   5. S/P cardiac pacemaker procedure 10/2020  Stress test (Lexiscan)    Holter monitor 48 hour   6. History of syncope  Stress test (Lexiscan)    Holter monitor 48 hour   7. AV block, 2nd degree with 2: 1 AV block  Stress test (Lexiscan)    Holter monitor 48 hour   8. RBBB (right bundle branch block with left anterior fascicular block)  Stress test (Lexiscan)    Holter monitor 48 hour   9. Abnormal EKG  Stress test (Lexiscan)    Holter monitor 48 hour         Plan     meds and labs reviewed      Continue the current treatment and with constant vigilance to changes in symptoms and also any potential side effects.   Return for care or seek medical attention immediately if symptoms got worse and/or develop new symptoms. SYMPTOMATIC bradyarrhythmia WITH 2ND DEGREE av BLOCK AND BIFASCICULAR BLOCK) AND RECENT HX OF SYNCOPE- APPEARS CARDIAC CAUSE AND INTERMITTENT DIZZINESS   Bifascicular block with 2:1 AV block- 2nd degree  VENTRICULAR RATE 44  Hx of dizziness  Hx of syncope  Had pacer  Pacer site look good      Sob on exertion  On radial pulse a lots of pulse deficit  With pvcs  Need HOLter 48 hrs  lexisc nuc stress      Postural dizziness for yrs  Better after pacer  Bedside orthostatic eval negative  32 oz a day  Advised to drink more         Hypertension, on medical treatment. Seems to be under good control. Patient is compliant with medical treatment. On lisinopril 5 mg po qd     Hyperlipidemia: on statins, followed periodically. Patient need periodic lipid and liver profile.     D/w the pat AT LENGTH  the plan of care    Hx of factor Vleiden AND hX OF dvt  On xeralto    RTC in 3 months      [facility]

## 2020-11-17 ENCOUNTER — HOSPITAL ENCOUNTER (OUTPATIENT)
Dept: NON INVASIVE DIAGNOSTICS | Age: 65
Discharge: HOME | End: 2020-11-17
Payer: COMMERCIAL

## 2020-11-17 PROCEDURE — 93225 XTRNL ECG REC<48 HRS REC: CPT

## 2020-11-17 PROCEDURE — 6360000002 HC RX W HCPCS

## 2020-11-17 PROCEDURE — 93226 XTRNL ECG REC<48 HR SCAN A/R: CPT

## 2020-11-17 PROCEDURE — A9500 TC99M SESTAMIBI: HCPCS | Performed by: INTERNAL MEDICINE

## 2020-11-17 PROCEDURE — 3430000000 HC RX DIAGNOSTIC RADIOPHARMACEUTICAL: Performed by: INTERNAL MEDICINE

## 2020-11-17 PROCEDURE — 78452 HT MUSCLE IMAGE SPECT MULT: CPT

## 2020-11-17 PROCEDURE — 93017 CV STRESS TEST TRACING ONLY: CPT | Performed by: INTERNAL MEDICINE

## 2020-11-17 RX ADMIN — Medication 10.1 MILLICURIE: at 07:25

## 2020-11-17 RX ADMIN — Medication 33.9 MILLICURIE: at 08:30

## 2020-11-29 LAB
ACQUISITION DURATION: NORMAL S
AVERAGE HEART RATE: 74 BPM
FASTEST SUPRAVENTRICULAR RATE: 136 BPM
FASTEST VENTRICULAR RATE: 141 BPM
HOOKUP DATE: NORMAL
HOOKUP TIME: NORMAL
LONGEST SUPRAVENTRICULAR RATE: 129 BPM
LONGEST VE RUN RATE: 104 BPM
MAX HEART RATE TIME/DATE: NORMAL
MAX HEART RATE: 141 BPM
MIN HEART RATE TIME/DATE: NORMAL
MIN HEART RATE: 59 BPM
NUMBER OF FASTEST SUPRAVENTRICULAR BEATS: 3
NUMBER OF FASTEST VENTRICULAR BEATS: 3
NUMBER OF LONGEST SUPRAVENTRICULAR BEATS: 8
NUMBER OF LONGEST VENTRICULAR BEATS: 3
NUMBER OF QRS COMPLEXES: NORMAL
NUMBER OF SUPRAVENTRICULAR BEATS IN RUNS: 416
NUMBER OF SUPRAVENTRICULAR COUPLETS: 179
NUMBER OF SUPRAVENTRICULAR ECTOPICS: 3863
NUMBER OF SUPRAVENTRICULAR ISOLATED BEATS: 3086
NUMBER OF SUPRAVENTRICULAR RUNS: 102
NUMBER OF VENTRICULAR BEATS IN RUNS: 24
NUMBER OF VENTRICULAR BIGEMINAL CYCLES: 56
NUMBER OF VENTRICULAR COUPLETS: 153
NUMBER OF VENTRICULAR ECTOPICS: 5577
NUMBER OF VENTRICULAR ISOLATED BEATS: 5247
NUMBER OF VENTRICULAR RUNS: 8

## 2021-01-05 ENCOUNTER — NURSE ONLY (OUTPATIENT)
Dept: CARDIOLOGY CLINIC | Age: 66
End: 2021-01-05
Payer: COMMERCIAL

## 2021-01-05 DIAGNOSIS — Z95.0 S/P CARDIAC PACEMAKER PROCEDURE: Primary | ICD-10-CM

## 2021-01-05 PROCEDURE — 93288 INTERROG EVL PM/LDLS PM IP: CPT | Performed by: INTERNAL MEDICINE

## 2021-01-05 NOTE — PROGRESS NOTES
medtronic dual pacer in office/chronics     . [place] Battery longevity:  10.8 years   Presenting rhythm  AP   1/1/21  9 beats VT     Atrial impedance 437  RV impedance 570    P wave sensing 6.1  R wave sensing 11.9    28.7 % atrial paced  97.6 % RV paced     Atrial threshold 0.5 V  at 0.4ms chronic amplitude to 2.5  RV threshold 0.75 V at 0.4ms chronic amplitude to 2.5  Mode switches   0

## 2021-03-29 ENCOUNTER — OFFICE VISIT (OUTPATIENT)
Dept: CARDIOLOGY CLINIC | Age: 66
End: 2021-03-29
Payer: COMMERCIAL

## 2021-03-29 VITALS
HEART RATE: 65 BPM | HEIGHT: 76 IN | BODY MASS INDEX: 34.73 KG/M2 | SYSTOLIC BLOOD PRESSURE: 120 MMHG | DIASTOLIC BLOOD PRESSURE: 73 MMHG | WEIGHT: 285.2 LBS

## 2021-03-29 DIAGNOSIS — R06.02 SOB (SHORTNESS OF BREATH) ON EXERTION: ICD-10-CM

## 2021-03-29 DIAGNOSIS — I10 ESSENTIAL HYPERTENSION: Primary | ICD-10-CM

## 2021-03-29 DIAGNOSIS — R94.31 ABNORMAL EKG: ICD-10-CM

## 2021-03-29 DIAGNOSIS — Z95.0 S/P CARDIAC PACEMAKER PROCEDURE: ICD-10-CM

## 2021-03-29 DIAGNOSIS — R42 POSTURAL DIZZINESS: ICD-10-CM

## 2021-03-29 DIAGNOSIS — E78.00 PURE HYPERCHOLESTEROLEMIA: ICD-10-CM

## 2021-03-29 DIAGNOSIS — I44.1 AV BLOCK, 2ND DEGREE: ICD-10-CM

## 2021-03-29 PROCEDURE — 3017F COLORECTAL CA SCREEN DOC REV: CPT | Performed by: INTERNAL MEDICINE

## 2021-03-29 PROCEDURE — G8484 FLU IMMUNIZE NO ADMIN: HCPCS | Performed by: INTERNAL MEDICINE

## 2021-03-29 PROCEDURE — 99214 OFFICE O/P EST MOD 30 MIN: CPT | Performed by: INTERNAL MEDICINE

## 2021-03-29 PROCEDURE — 1036F TOBACCO NON-USER: CPT | Performed by: INTERNAL MEDICINE

## 2021-03-29 PROCEDURE — G8417 CALC BMI ABV UP PARAM F/U: HCPCS | Performed by: INTERNAL MEDICINE

## 2021-03-29 PROCEDURE — G8427 DOCREV CUR MEDS BY ELIG CLIN: HCPCS | Performed by: INTERNAL MEDICINE

## 2021-03-29 NOTE — PROGRESS NOTES
Chief Complaint   Patient presents with    3 Month Follow-Up    Hypertension   Originally  patient referred for bradycardia. And need pacer and had pacer already    4 month follow up. EKG done 10-8-20. Denied cp, palpitation or edema    Sob and dizziness improved after pacer    Had more energy after pacer and more active    Sob on exertion- in the last few months prior to pacer and a little better    Occasional dizziness on getting up fast- for years\aelx after pacer    Had syncope-late July 2020  predrome-NOTHING SIGNIFICANT  VERY SHORT LASTING dizziness  NO Injury  Did not seek medical advise    postdrome- NONE  No symptom of nause, or sweating or feeling tired  WAKE UP FROM SYNCOPE AND BACK TO BASELINE  Mid august slipped and fall with no LOC, was on pain medication    Hx of Bradycardia and rate of 33  BPM noted at a home    Had cardiac cath in [place] 5 yrs back and known to have LBBB then as well    nevere smoked    Factor V lieden and Hx of DVT 10 yrs back- and on [facility]  Mother had CHF,  None for cAD or pacer need    Patient Active Problem List   Diagnosis    AV block, 2nd degree with 2: 1 AV block    RBBB (right bundle branch block with left anterior fascicular block)    Bradycardia    History of syncope    Postural dizziness    Essential hypertension    Pure hypercholesterolemia    S/P cardiac pacemaker procedure 10/2020    SOB (shortness of breath) on exertion    Abnormal EKG       Past Surgical History:   Procedure Laterality Date    CARDIAC PACEMAKER PLACEMENT  10/08/2020    CHOLECYSTECTOMY      GALLBLADDER SURGERY      PROSTATE SURGERY  2015       No Known Allergies     History reviewed. No pertinent family history.      Social History     Socioeconomic History    Marital status:      Spouse name: Tasha Cook Number of children: 2    Years of education: 12 years    Highest education level: Not on file   Occupational History    Not on file   Social Needs    Financial resource strain: Not on file    Food insecurity     Worry: Not on file     Inability: Not on file    Transportation needs     Medical: Not on file     Non-medical: Not on file   Tobacco Use    Smoking status: Never Smoker    Smokeless tobacco: Never Used   Substance and Sexual Activity    Alcohol use: Yes     Comment: socially    Drug use: Never    Sexual activity: Not on file   Lifestyle    Physical activity     Days per week: Not on file     Minutes per session: Not on file    Stress: Not on file   Relationships    Social connections     Talks on phone: Not on file     Gets together: Not on file     Attends [employer] service: Not on file     Active member of club or organization: Not on file     Attends meetings of clubs or organizations: Not on file     Relationship status: Not on file    Intimate partner violence     Fear of current or ex partner: Not on file     Emotionally abused: Not on file     Physically abused: Not on file     Forced sexual activity: Not on file   Other Topics Concern    Not on file   Social History Narrative    Not on file       Current Outpatient Medications   Medication Sig Dispense Refill    b complex vitamins capsule Take 2 capsules by mouth daily      Flaxseed, Linseed, (FLAX SEEDS PO) Take 2 tablets by mouth daily      rivaroxaban (XARELTO) 20 MG TABS tablet Take by mouth daily       atorvastatin (LIPITOR) 20 MG tablet Take 20 mg by mouth daily       lisinopril (PRINIVIL;ZESTRIL) 5 MG tablet Take 5 mg by mouth daily        No current facility-administered medications for this visit. Review of Systems -     General ROS: negative  Psychological ROS: negative  Hematological and Lymphatic ROS: No history of blood clots or bleeding disorder.    Respiratory ROS: no cough,  or wheezing, the rest see HPI  Cardiovascular ROS: See HPI  Gastrointestinal ROS: negative  Genito-Urinary ROS: no dysuria, trouble voiding, or hematuria  Musculoskeletal ROS: negative  Neurological ROS: no TIA or stroke symptoms  Dermatological ROS: negative      Blood pressure 120/73, pulse 65, height 6' 4\" (1.93 m), weight 285 lb 3.2 oz (129.4 kg). Physical Examination:    General appearance - alert, well appearing, and in no distress  HEENT- Pink conjunctiva  , Non-icteri sclera,PERRLA  Mental status - alert, oriented to person, place, and time  Neck - supple, no significant adenopathy, no JVD, or carotid bruits  Chest - clear to auscultation, no wheezes, rales or rhonchi, symmetric air entry  Heart - normal rate, regular rhythm, normal S1, S2, no murmurs, rubs, clicks or gallops  Abdomen - soft, nontender, nondistended, no masses or organomegaly  TERRY- no CVA or flank tenderness, no suprapubic tenderness  Neurological - alert, oriented, normal speech, no focal findings or movement disorder noted  Musculoskeletal/limbs - no joint tenderness, deformity or swelling   - peripheral pulses normal, no pedal edema, no clubbing or cyanosis  Skin - normal coloration and turgor, no rashes, no suspicious skin lesions noted  Psych- appropriate mood and affect    Lab  No results for input(s): CKTOTAL, CKMB, CKMBINDEX, TROPONINI in the last 72 hours.   CBC:   Lab Results   Component Value Date    WBC 7.7 10/08/2020    RBC 5.70 10/08/2020    HGB 15.5 10/08/2020    HCT 49.4 10/08/2020    MCV 86.7 10/08/2020    MCH 27.2 10/08/2020    MCHC 31.4 10/08/2020     10/08/2020    MPV 10.8 10/08/2020     BMP:    Lab Results   Component Value Date     10/08/2020    K 5.0 10/08/2020     10/08/2020    CO2 27 10/08/2020    BUN 21 10/08/2020    CREATININE 1.1 10/08/2020    CALCIUM 9.6 10/08/2020    LABGLOM 68 10/08/2020    GLUCOSE 103 10/08/2020     Hepatic Function Panel:  No results found for: ALKPHOS, ALT, AST, PROT, BILITOT, BILIDIR, IBILI, LABALBU  Magnesium:  No results found for: MG  Warfarin PT/INR:  No components found for: PTPATWAR, PTINRWAR  HgBA1c:  No results found for: RECENT HX OF SYNCOPE AND INTERMITTENT DIZZINESS      Diagnosis Orders   1. Essential hypertension     2. Pure hypercholesterolemia     3. Postural dizziness     4. SOB (shortness of breath) on exertion     5. S/P cardiac pacemaker procedure 10/2020     6. Abnormal EKG     7. AV block, 2nd degree with 2: 1 AV block           Plan     THE CURRENT meds and labs reviewed    Continue the current treatment and with constant vigilance to changes in symptoms and also any potential side effects. Return for care or seek medical attention immediately if symptoms got worse and/or develop new symptoms. Hx of SYMPTOMATIC bradyarrhythmia WITH 2ND DEGREE av BLOCK AND BIFASCICULAR BLOCK) AND RECENT HX OF SYNCOPE- APPEARS CARDIAC CAUSE AND INTERMITTENT DIZZINESS   Bifascicular block with 2:1 AV block- 2nd degree  VENTRICULAR RATE 44  Hx of dizziness  Hx of syncope  Had pacer  Pacer  Interrogation JAN 2021 AND wnl      Sob on exertion  On radial pulse a lots of pulse deficit  With pvcs  ABN  HOLter 48 hrs- FREQ PVC AND PACS  lexisc nuc stress- NEG    HX OF Postural dizziness for yrs  MUCH Better after pacer  Bedside orthostatic eval negative  32 oz a day  Advised to drink more     Hypertension, on medical treatment. Seems to be under good control. Patient is compliant with medical treatment. On lisinopril 5 mg po qd     Hyperlipidemia: on statins, followed periodically. Patient need periodic lipid and liver profile. D/w the pat AT LENGTH  the plan of care    Hx of factor Vleiden AND hX OF dvt  On xeralto    OVERALL FEEL GOOD AND MUCH BETTER AFTER PACER    Discussed use, benefit, and side effects of prescribed medications. All patient questions answered. Pt voiced understanding. Instructed to continue current medications, diet and exercise. Continue risk factor modification and medical management. Patient agreed with treatment plan. Follow up as directed.       RTC in 6 months      Jennifer Rivera [facility]

## 2021-04-09 ENCOUNTER — PROCEDURE VISIT (OUTPATIENT)
Dept: CARDIOLOGY CLINIC | Age: 66
End: 2021-04-09
Payer: COMMERCIAL

## 2021-04-09 DIAGNOSIS — Z95.0 PACEMAKER: Primary | ICD-10-CM

## 2021-04-09 PROCEDURE — 93294 REM INTERROG EVL PM/LDLS PM: CPT | Performed by: INTERNAL MEDICINE

## 2021-04-09 PROCEDURE — 93296 REM INTERROG EVL PM/IDS: CPT | Performed by: INTERNAL MEDICINE

## 2021-07-16 ENCOUNTER — PROCEDURE VISIT (OUTPATIENT)
Dept: CARDIOLOGY CLINIC | Age: 66
End: 2021-07-16
Payer: COMMERCIAL

## 2021-07-16 DIAGNOSIS — Z95.0 PACEMAKER: Primary | ICD-10-CM

## 2021-07-16 PROCEDURE — 93296 REM INTERROG EVL PM/IDS: CPT | Performed by: INTERNAL MEDICINE

## 2021-07-16 PROCEDURE — 93294 REM INTERROG EVL PM/LDLS PM: CPT | Performed by: INTERNAL MEDICINE

## 2021-07-20 NOTE — PROGRESS NOTES
Device Interrogation Reviewed.   Nsvt noted  On may 15, 2021 had  One spides of fast supraventricular tachycardia -at rate A 144 bpm ( appears regular) and V 69 bpm ( regular)  Last 4.9 sec  Appears to me atrial flutter with 2 :1  av block  Chadscvasc 1 ( htn)    Plan  Cont monitoring  Consider OA if there any recurrence of similar rhythm atr flutter

## 2021-10-11 ENCOUNTER — OFFICE VISIT (OUTPATIENT)
Dept: CARDIOLOGY CLINIC | Age: 66
End: 2021-10-11
Payer: COMMERCIAL

## 2021-10-11 VITALS
HEART RATE: 83 BPM | BODY MASS INDEX: 33.44 KG/M2 | WEIGHT: 274.6 LBS | SYSTOLIC BLOOD PRESSURE: 121 MMHG | HEIGHT: 76 IN | DIASTOLIC BLOOD PRESSURE: 78 MMHG

## 2021-10-11 DIAGNOSIS — Z95.0 S/P CARDIAC PACEMAKER PROCEDURE: ICD-10-CM

## 2021-10-11 DIAGNOSIS — R06.02 SOB (SHORTNESS OF BREATH) ON EXERTION: Primary | ICD-10-CM

## 2021-10-11 DIAGNOSIS — E78.00 PURE HYPERCHOLESTEROLEMIA: ICD-10-CM

## 2021-10-11 DIAGNOSIS — R42 POSTURAL DIZZINESS: ICD-10-CM

## 2021-10-11 DIAGNOSIS — I45.2 RBBB (RIGHT BUNDLE BRANCH BLOCK WITH LEFT ANTERIOR FASCICULAR BLOCK): ICD-10-CM

## 2021-10-11 PROCEDURE — 99214 OFFICE O/P EST MOD 30 MIN: CPT | Performed by: INTERNAL MEDICINE

## 2021-10-11 PROCEDURE — 93000 ELECTROCARDIOGRAM COMPLETE: CPT | Performed by: INTERNAL MEDICINE

## 2021-10-11 PROCEDURE — G8484 FLU IMMUNIZE NO ADMIN: HCPCS | Performed by: INTERNAL MEDICINE

## 2021-10-11 PROCEDURE — G8427 DOCREV CUR MEDS BY ELIG CLIN: HCPCS | Performed by: INTERNAL MEDICINE

## 2021-10-11 PROCEDURE — G8417 CALC BMI ABV UP PARAM F/U: HCPCS | Performed by: INTERNAL MEDICINE

## 2021-10-11 PROCEDURE — 3017F COLORECTAL CA SCREEN DOC REV: CPT | Performed by: INTERNAL MEDICINE

## 2021-10-11 PROCEDURE — 1036F TOBACCO NON-USER: CPT | Performed by: INTERNAL MEDICINE

## 2021-10-21 ENCOUNTER — HOSPITAL ENCOUNTER (OUTPATIENT)
Dept: NON INVASIVE DIAGNOSTICS | Age: 66
Discharge: HOME | End: 2021-10-21
Payer: COMMERCIAL

## 2021-10-21 DIAGNOSIS — R06.02 SOB (SHORTNESS OF BREATH) ON EXERTION: ICD-10-CM

## 2021-10-21 DIAGNOSIS — Z95.0 S/P CARDIAC PACEMAKER PROCEDURE: ICD-10-CM

## 2021-10-21 DIAGNOSIS — R42 POSTURAL DIZZINESS: ICD-10-CM

## 2021-10-21 DIAGNOSIS — I45.2 RBBB (RIGHT BUNDLE BRANCH BLOCK WITH LEFT ANTERIOR FASCICULAR BLOCK): ICD-10-CM

## 2021-10-21 DIAGNOSIS — E78.00 PURE HYPERCHOLESTEROLEMIA: ICD-10-CM

## 2021-10-21 LAB
LV EF: 50 %
LVEF MODALITY: NORMAL

## 2021-10-21 PROCEDURE — A9500 TC99M SESTAMIBI: HCPCS | Performed by: INTERNAL MEDICINE

## 2021-10-21 PROCEDURE — 78452 HT MUSCLE IMAGE SPECT MULT: CPT

## 2021-10-21 PROCEDURE — 93017 CV STRESS TEST TRACING ONLY: CPT | Performed by: INTERNAL MEDICINE

## 2021-10-21 PROCEDURE — 6360000002 HC RX W HCPCS

## 2021-10-21 PROCEDURE — 3430000000 HC RX DIAGNOSTIC RADIOPHARMACEUTICAL: Performed by: INTERNAL MEDICINE

## 2021-10-21 PROCEDURE — 93306 TTE W/DOPPLER COMPLETE: CPT

## 2021-10-21 RX ADMIN — Medication 8.7 MILLICURIE: at 11:15

## 2021-10-21 RX ADMIN — Medication 30.3 MILLICURIE: at 12:05

## 2021-10-22 ENCOUNTER — PROCEDURE VISIT (OUTPATIENT)
Dept: CARDIOLOGY CLINIC | Age: 66
End: 2021-10-22
Payer: COMMERCIAL

## 2021-10-22 DIAGNOSIS — Z95.0 S/P CARDIAC PACEMAKER PROCEDURE: Primary | ICD-10-CM

## 2021-10-22 PROCEDURE — 93296 REM INTERROG EVL PM/IDS: CPT | Performed by: INTERNAL MEDICINE

## 2021-10-22 PROCEDURE — 93294 REM INTERROG EVL PM/LDLS PM: CPT | Performed by: INTERNAL MEDICINE

## 2021-10-22 NOTE — PROGRESS NOTES
[employer] medtronic dual pacer     . [facility]  [facility]   Presenting rhythm  AS     Atrial impedance 494  RV impedance 513    P wave sensing 3  R wave sensing 9.3    31.6 % atrial paced  98.3 % RV paced     Atrial threshold 0.875 V  at 0.4ms  RV threshold 0.625 V at 0.4ms  Mode switches   On xarelto,  At flutter on occasion, but is also FF T waves occasionally burden might be higher than reality

## 2022-01-11 ENCOUNTER — NURSE ONLY (OUTPATIENT)
Dept: CARDIOLOGY CLINIC | Age: 67
End: 2022-01-11
Payer: COMMERCIAL

## 2022-01-11 DIAGNOSIS — Z95.0 S/P CARDIAC PACEMAKER PROCEDURE: Primary | ICD-10-CM

## 2022-01-11 PROCEDURE — 93280 PM DEVICE PROGR EVAL DUAL: CPT | Performed by: INTERNAL MEDICINE

## 2022-01-11 NOTE — PROGRESS NOTES
DR Karen Adair PT  MEDTRONIC DUAL PACEMAKER CHECK IN OFFICE   HAS CARELINK  PRESENTS IN APVP 100  UNDERLYING ASVS 70'S  DDDR     A PACED 34%  V PACED 95.1%    P WAVES 2.1  RV WAVES 8.6    ATRIAL IMPEDENCE 551  VENT IMPEDENCE 551    ATRIAL THREHSOLD 0.75 @ 0.4  RV THRESHOLD 0.75 @ 0.4    ATRIAL AMPLITUDE 1.5 @ 0.4  VENT AMPLITUDE 2 @ 0.4    10/5/21, EPISODE OF SVT FOR <:0.1 SECONDS  10/5/21 LOOKS LIKE PT HAD SOME T WAVE OVERSENSING    ATRIAL SENSITIVITY CHANGED FROM 0.6 TO 0.9    PVARP CHANGED FROM 250 MS  MS  10/12/21 LOOKS LIKE AN EPISODE OF AFIB

## 2022-01-12 NOTE — PROGRESS NOTES
Device Interrogation Reviewed.   It appears one episode of atr fib for 40 sec  Overall < 0.1 %  No OA at this time will follow on next interrogation

## 2022-02-02 NOTE — PROGRESS NOTES
Chief Complaint   Patient presents with    6 Month Follow-Up    Hypertension   Originally  patient referred for bradycardia. And need pacer and had pacer already        6 month follow up. Sob on exertion worse in the last 4 months  Mainly after mowing\  No wt gain    EKG done today. Denied cp, palpitation or edema    Sob and dizziness improved after pacer  Now worsening of sob    Had more energy after pacer and more active    Occasional dizziness on getting up fast- for years\alex after pacer    Had syncope-late July 2020  predrome-NOTHING SIGNIFICANT  VERY SHORT LASTING dizziness  NO Injury  Did not seek medical advise    postdrome- NONE  No symptom of nause, or sweating or feeling tired  WAKE UP FROM SYNCOPE AND BACK TO BASELINE  Mid august slipped and fall with no LOC, was on pain medication    Hx of Bradycardia and rate of 33  BPM noted at a home    Had cardiac cath in [place] 5 yrs back and known to have LBBB then as well    nevere smoked    Factor V lieden and Hx of DVT 10 yrs back- and on [facility]  Mother had CHF,  None for cAD or pacer need    Patient Active Problem List   Diagnosis    AV block, 2nd degree with 2: 1 AV block    RBBB (right bundle branch block with left anterior fascicular block)    Bradycardia    History of syncope    Postural dizziness    Essential hypertension    Pure hypercholesterolemia    S/P cardiac pacemaker procedure 10/2020    SOB (shortness of breath) on exertion    Abnormal EKG       Past Surgical History:   Procedure Laterality Date    CARDIAC PACEMAKER PLACEMENT  10/08/2020    CHOLECYSTECTOMY      GALLBLADDER SURGERY      PROSTATE SURGERY  2015       No Known Allergies     History reviewed. No pertinent family history.      Social History     Socioeconomic History    Marital status:      Spouse name: Abe Em Number of children: 2    Years of education: 12 years    Highest education level: Not on file   Occupational History    Not on Triage Chief Complaint:   Shortness of Breath (trouble swallowing)    Chitina:  Shahnaz Fernandez is a 54 y.o. female that presents with shortness of breath, throat tightness. States that she had just finished eating some bugles. She was up walking to her chair and smoking a cigarette when she had sudden onset of shortness of breath, feeling of throat tightness. States that it is difficult for her to swallow. Denies any foreign body sensation, choking episode. States that this occurred about 10 minutes prior to EMS arrival.  States that she is now starting to feel lightheaded like she may pass out and her legs are starting to feel tight. She is worried about possible heart failure given her history of CHF. Denies any new swelling in her legs. Denies any fever, cough, chest pain, abdominal pain. States that she feels like she cannot swallow but has been able to tolerate her secretions. No vomiting or diarrhea. Does admit that she has had a rough day and had just gotten home from her mother's house which had initiated some increased stress. ROS:  At least 10 systems reviewed and otherwise acutely negative except as in the [address]     Past Medical History:   Diagnosis Date    Anxiety     Bladder tumor     Dr. Demetrius Thomas Depression     Diverticulitis     Last episode: 7/1/2018    Emphysema of lung ([address]     Follows with Rk Jarar    GERD (gastroesophageal reflux disease)     History of blood transfusion 2018    After surgery for dog attack    Hypertension     Dr. Kimber Ragland IBS (irritable bowel syndrome)     Kidney stone     Last stone: 2013    PTSD (post-traumatic stress disorder) 02/03/2019    from dog attack    Sleep apnea     Uses BiPap     Past Surgical History:   Procedure Laterality Date   [address] BLADDER SURGERY  2012    CHOLECYSTECTOMY  2001    COLONOSCOPY  03/26/2018    normal colonoscopy - Dr. Rosalinda Olivier  1980's    [address] [facility]  2011    HYSTERECTOMY, TOTAL ABDOMINAL  2012    INCISION AND DRAINAGE Left 2019    ANKLE INCISION AND DRAINAGE performed by Haley Nielson DO at [address] 2019    ARM INCISION AND DRAINAGE performed by Haely Nielson DO at [address] [facility]      tumor removal    OVARIAN CYST REMOVAL  2012    TONSILLECTOMY  1980    TUBAL LIGATION  2011    UPPER GASTROINTESTINAL ENDOSCOPY N/A 2021    EGD POLYP ABLATION/OTHER OF ANTRAL POLYP AND BX performed by Aliya Henderson MD at [facility] ENDOSCOPY     Family History   Problem Relation Age of Onset    Alcohol Abuse Mother     Sleep Apnea Father      Social History     Socioeconomic History    Marital status: Single     Spouse name: Not on file    Number of children: Not on file    Years of education: Not on file    Highest education level: Not on file   Occupational History    Not on file   Tobacco Use    Smoking status: Former Smoker     Packs/day: 1.00     Years: 19.00     Pack years: 19.00     Types: Cigarettes     Start date: 2001     Quit date: 2020     Years since quittin.5    Smokeless tobacco: Never Used   Vaping Use    Vaping Use: Never used   Substance and Sexual Activity    Alcohol use: No    Drug use: Yes     Types: Marijuana (Weed)     Comment: pt states last consumption was sometime within a month    Sexual activity: Yes     Partners: Male   Other Topics Concern    Not on file   Social History Narrative    Not on file     Social Determinants of Health     Financial Resource Strain:     Difficulty of Paying Living Expenses: Not on file   Food Insecurity:     Worried About [address] in the Last Year: Not on file    Sydnie of Food in the Last Year: Not on file   Transportation Needs:     Lack of Transportation (Medical): Not on file    Lack of Transportation (Non-Medical):  Not on file   Physical Activity:     Days of Exercise per Week: Not on file    Minutes of Exercise file   Tobacco Use    Smoking status: Never Smoker    Smokeless tobacco: Never Used   Vaping Use    Vaping Use: Never used   Substance and Sexual Activity    Alcohol use: Yes     Comment: socially    Drug use: Never    Sexual activity: Not on file   Other Topics Concern    Not on file   Social History Narrative    Not on file     Social Determinants of Health     Financial Resource Strain:     Difficulty of Paying Living Expenses:    Food Insecurity:     Worried About Running Out of Food in the Last Year:     [address] in the Last Year:    Transportation Needs:     Lack of Transportation (Medical):  Lack of Transportation (Non-Medical):    Physical Activity:     Days of Exercise per Week:     Minutes of Exercise per Session:    Stress:     Feeling of Stress :    Social Connections:     Frequency of Communication with Friends and Family:     Frequency of Social Gatherings with Friends and Family:     Attends Hindu Services:     Active Member of Clubs or Organizations:     Attends Club or Organization Meetings:     Marital Status:    Intimate Partner Violence:     Fear of Current or Ex-Partner:     Emotionally Abused:     Physically Abused:     Sexually Abused:        Current Outpatient Medications   Medication Sig Dispense Refill    b complex vitamins capsule Take 2 capsules by mouth daily      Flaxseed, Linseed, (FLAX SEEDS PO) Take 2 tablets by mouth daily      rivaroxaban (XARELTO) 20 MG TABS tablet Take by mouth daily       atorvastatin (LIPITOR) 20 MG tablet Take 20 mg by mouth daily       lisinopril (PRINIVIL;ZESTRIL) 5 MG tablet Take 5 mg by mouth daily        No current facility-administered medications for this visit. Review of Systems -     General ROS: negative  Psychological ROS: negative  Hematological and Lymphatic ROS: No history of blood clots or bleeding disorder.    Respiratory ROS: no cough,  or wheezing, the rest see HPI  Cardiovascular ROS: See HPI  Gastrointestinal ROS: negative  Genito-Urinary ROS: no dysuria, trouble voiding, or hematuria  Musculoskeletal ROS: negative  Neurological ROS: no TIA or stroke symptoms  Dermatological ROS: negative      Blood pressure 121/78, pulse 83, height 6' 4\" (1.93 m), weight 274 lb 9.6 oz (124.6 kg). Physical Examination:    General appearance - alert, well appearing, and in no distress  HEENT- Pink conjunctiva  , Non-icteri sclera,PERRLA  Mental status - alert, oriented to person, place, and time  Neck - supple, no significant adenopathy, no JVD, or carotid bruits  Chest - clear to auscultation, no wheezes, rales or rhonchi, symmetric air entry  Heart - normal rate, regular rhythm, normal S1, S2, no murmurs, rubs, clicks or gallops  Abdomen - soft, nontender, nondistended, no masses or organomegaly  TERRY- no CVA or flank tenderness, no suprapubic tenderness  Neurological - alert, oriented, normal speech, no focal findings or movement disorder noted  Musculoskeletal/limbs - no joint tenderness, deformity or swelling   - peripheral pulses normal, no pedal edema, no clubbing or cyanosis  Skin - normal coloration and turgor, no rashes, no suspicious skin lesions noted  Psych- appropriate mood and affect    Lab  No results for input(s): CKTOTAL, CKMB, CKMBINDEX, TROPONINI in the last 72 hours.   CBC:   Lab Results   Component Value Date    WBC 7.7 10/08/2020    RBC 5.70 10/08/2020    HGB 15.5 10/08/2020    HCT 49.4 10/08/2020    MCV 86.7 10/08/2020    MCH 27.2 10/08/2020    MCHC 31.4 10/08/2020     10/08/2020    MPV 10.8 10/08/2020     BMP:    Lab Results   Component Value Date     10/08/2020    K 5.0 10/08/2020     10/08/2020    CO2 27 10/08/2020    BUN 21 10/08/2020    CREATININE 1.1 10/08/2020    CALCIUM 9.6 10/08/2020    LABGLOM 68 10/08/2020    GLUCOSE 103 10/08/2020     Hepatic Function Panel:  No results found for: ALKPHOS, ALT, AST, PROT, BILITOT, BILIDIR, IBILI, LABALBU  Magnesium:  No per Session: Not on file   Stress:     Feeling of Stress : Not on file   Social Connections:     Frequency of Communication with Friends and Family: Not on file    Frequency of Social Gatherings with Friends and Family: Not on file    Attends Sabianism Services: Not on file    Active Member of [address] [place] [address] or Organizations: Not on file    Attends Club or Organization Meetings: Not on file    Marital Status: Not on file   Intimate Partner Violence:     Fear of Current or Ex-Partner: Not on file    Emotionally Abused: Not on file    Physically Abused: Not on file    Sexually Abused: Not on file   Housing Stability:     Unable to Pay for Housing in the Last Year: Not on file    Number of Torin in the Last Year: Not on file    Unstable Housing in the Last Year: Not on file     Current Facility-Administered Medications   Medication Dose Route Frequency Provider Last Rate Last Admin    LORazepam (ATIVAN) injection 1 mg  1 mg IntraVENous Once Severa Boxer, MD         Current Outpatient Medications   Medication Sig Dispense Refill    ondansetron (ZOFRAN) 4 MG tablet Take 1 tablet by mouth 3 times daily as needed for Nausea or Vomiting 30 tablet 0    predniSONE (DELTASONE) 10 MG tablet Take 4 tablets daily for 2 days, then 3 tablets for 2 days, 2 tablets for 2 days, then 1 tablet for 2 days (Patient not taking: Reported on 1/14/2022) 20 tablet 0    SYMBICORT 160-4.5 MCG/ACT AERO inhale 2 puffs by mouth twice a day 10.2 g 2    potassium chloride (KLOR-CON M) 10 MEQ extended release tablet take 1 tablet by mouth once daily 30 tablet 2    furosemide (LASIX) 20 MG tablet take 1 tablet by mouth once daily 90 tablet 0    docusate sodium (COLACE) 100 MG capsule take 1 capsule by mouth twice a day if needed for constipation 30 capsule 0    clotrimazole (LOTRIMIN) 1 % cream apply to affected area twice a day 30 g 2    V-R MAGNESIUM CITRATE 1.745 GM/30ML solution DRINK 296 MLS ONCE AS ONE TIME DOSE 296 mL 0    ACETAMINOPHEN EXTRA STRENGTH 500 MG tablet take 1 tablet by mouth three times a day AS NEEDED FOR PAIN 90 tablet 0    albuterol sulfate  (90 Base) MCG/ACT inhaler inhale 2 puffs by mouth INTO THE LUNGS every 4 hours if needed for shortness of breath or wheezing 18 g 2    diclofenac 1 % CREA Place 1 Tube onto the skin daily as needed for Pain 120 g 0    gabapentin (NEURONTIN) 100 MG capsule Take 1 capsule by mouth 2 times daily for 30 days. 60 capsule 2    omeprazole (PRILOSEC) 20 MG delayed release capsule Take 1 capsule by mouth Daily 30 capsule 5    dicyclomine (BENTYL) 10 MG capsule take 1 capsule by mouth three times a day BEFORE A MEAL 90 capsule 5    fluticasone (FLONASE) 50 MCG/ACT nasal spray instill 2 sprays into each nostril once daily 16 g 5    dextran 70-hypromellose (ARTIFICIAL TEARS) 0.1-0.3 % SOLN opthalmic solution Place 1 drop into both eyes every 4 hours as needed (Eye irritation) 1 Bottle 0    loratadine-pseudoephedrine (CLARITIN-D 24HR)  MG per extended release tablet Take 1 tablet by mouth daily 30 tablet 3     Allergies   Allergen Reactions    Dye [Iodides] Hives    Morphine Hives       Nursing Notes Reviewed    Physical Exam:  ED Triage Vitals   Enc Vitals Group      BP 02/01/22 2230 (!) 173/89      Pulse 02/01/22 2230 75      Resp 02/01/22 2230 20      Temp 02/01/22 2232 97.7 °F (36.5 °C)      Temp Source 02/01/22 2232 Oral      SpO2 02/01/22 2230 100 %      Weight --       Height --       Head Circumference --       Peak Flow --       Pain Score --       Pain Loc --       Pain Edu? --       Excl. in [address]? --      GENERAL APPEARANCE: Awake and alert. Cooperative. No acute distress. Appears anxious. Speaking in loud whipser  HEAD: Normocephalic. Atraumatic. EYES: EOM's grossly intact. Sclera anicteric. ENT: Mucous membranes are moist. Tolerates saliva. No trismus. No stridor or drooling. No posterior pharyngeal erythema, exudate or asymmetry. NECK: Supple.  No results found for: MG  Warfarin PT/INR:  No components found for: PTPATWAR, PTINRWAR  HgBA1c:  No results found for: LABA1C  FLP:  No results found for: TRIG, HDL, LDLCALC, LDLDIRECT, LABVLDL  TSH:  No results found for: TSH      ekg 10/5/2020  NSR, 2: 1 AV block, RBBB, RUTHANN, Bifascicular block, QRS duration 166 msec      Echo   Conclusions      Summary   Left Ventricular size is Mildly increased . Normal left ventricular wall thickness. Systolic function was normal.   There were no regional wall motion abnormalities. Ejection fraction is visually estimated at 60%. Doppler parameters were consistent with abnormal left ventricular   relaxation (grade 1 diastolic dysfunction). The left atrium is Mildly dilated. Signature      ----------------------------------------------------------------   Electronically signed by Marimar Villegas MD (Interpreting   physician) on 10/06/2020 at 06:03 PM   ----------------------------------------------------------------      Conclusions      Summary   Lexiscan EKG stress test is non diagnostic for ischemia due to baseline   LBBB   Calculated gated LVEF 44 %. The nuclear images is not suggestive for myocardial ischemia.       Signatures      ----------------------------------------------------------------   Electronically signed by Marimar Villegas MD (Interpreting   Cardiologist) on 11/17/2020 at 18:47   ----------------------------------------------------------------    48 Hours  holter     DIARY *  Symptom correlates with Normal sinus rhythm     CONCLUSION:  *     Normal Sinus rhythm    Average Heart Rate74 BPM Range from 59 bpm to 141 bpm   Frequent Premature atrial complexes -2%  Frequent Premature ventricular complexes -3%  Occasional PACED RHYTHM  No long pause or profound bradycardia  No Supraventricular Tachycardia   No Atrial Fibrillation    Clinical correlation indicated        Confirmed by June Soriano MD, [facility] ([id number]) on 11/29/2020 6:41:48 PM    ekg 10/11/21  Electronic meningismus. Trachea midline. HEART: RRR. Radial pulses 2+. LUNGS: Respirations tachypneic with clear lung sounds bilaterally and no retractions. Speaking in full sentences. ABDOMEN: Soft. Non-tender. No guarding or rebound. EXTREMITIES: No acute deformities. SKIN: Warm and dry. NEUROLOGICAL: No gross facial drooping. Moves all 4 extremities spontaneously. PSYCHIATRIC: Normal mood.     I have reviewed and interpreted all of the currently available lab results from this visit (if applicable):  Results for orders placed or performed during the hospital encounter of 02/01/22   CBC Auto Differential   Result Value Ref Range    WBC 6.6 4.0 - 10.5 K/CU MM    RBC 4.56 4.2 - 5.4 M/CU MM    Hemoglobin 12.9 12.5 - 16.0 GM/DL    Hematocrit 39.0 37 - 47 %    MCV 85.5 78 - 100 FL    MCH 28.3 27 - 31 PG    MCHC 33.1 32.0 - 36.0 %    RDW 12.8 11.7 - 14.9 %    Platelets 533 376 - 892 K/CU MM    MPV 11.5 (H) 7.5 - 11.1 FL    Differential Type AUTOMATED DIFFERENTIAL     Segs Relative 63.4 36 - 66 %    Lymphocytes % 23.8 (L) 24 - 44 %    Monocytes % 9.8 (H) 0 - 4 %    Eosinophils % 2.4 0 - 3 %    Basophils % 0.3 0 - 1 %    Segs Absolute 4.2 K/CU MM    Lymphocytes Absolute 1.6 K/CU MM    Monocytes Absolute 0.7 K/CU MM    Eosinophils Absolute 0.2 K/CU MM    Basophils Absolute 0.0 K/CU MM    Nucleated RBC % 0.0 %    Total Nucleated RBC 0.0 K/CU MM    Total Immature Neutrophil 0.02 K/CU MM    Immature Neutrophil % 0.3 0 - 0.43 %   Comprehensive Metabolic Panel w/ Reflex to MG   Result Value Ref Range    Sodium 142 135 - 145 MMOL/L    Potassium 3.5 3.5 - 5.1 MMOL/L    Chloride 103 99 - 110 mMol/L    CO2 24 21 - 32 MMOL/L    BUN 20 6 - 23 MG/DL    CREATININE 0.9 0.6 - 1.1 MG/DL    Glucose 104 (H) 70 - 99 MG/DL    Calcium 8.6 8.3 - 10.6 MG/DL    Albumin 3.7 3.4 - 5.0 GM/DL    Total Protein 6.0 (L) 6.4 - 8.2 GM/DL    Total Bilirubin 0.3 0.0 - 1.0 MG/DL    ALT 13 10 - 40 U/L    AST 18 15 - 37 IU/L    Alkaline Phosphatase 109 40 - 129 ventricular pacemaker   Pacemaker ECG, No further analysis   INSUFFICIENT DATA    Assessment    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 SOB (shortness of breath) on exertion  Stress test, lexiscan    ECHO Complete 2D W Doppler W Color   2. Postural dizziness  Stress test, lexiscan   3. Pure hypercholesterolemia  Stress test, lexiscan   4. RBBB (right bundle branch block with left anterior fascicular block)  Stress test, lexiscan    ECHO Complete 2D W Doppler W Color   5. S/P cardiac pacemaker procedure 10/2020  Stress test, lexiscan         Plan     THE CURRENT meds and labs reviewed    Continue the current treatment and with constant vigilance to changes in symptoms and also any potential side effects. Return for care or seek medical attention immediately if symptoms got worse and/or develop new symptoms. Hx of SYMPTOMATIC bradyarrhythmia WITH 2ND DEGREE av BLOCK AND BIFASCICULAR BLOCK) AND RECENT HX OF SYNCOPE- APPEARS CARDIAC CAUSE AND INTERMITTENT DIZZINESS   Bifascicular block with 2:1 AV block- 2nd degree  VENTRICULAR RATE 44  Hx of dizziness  Hx of syncope  Had pacer  Pacer  Interrogation july 2021 AND wnl      Sob on exertion on exertion wrose  On radial pulse a lots of pulse deficit  With pvcs  ABN  HOLter 48 hrs- FREQ PVC AND PACS  Echo  Krishan nuc    HX OF Postural dizziness for yrs  MUCH Better after pacer  Bedside orthostatic eval negative  32 oz a day  Advised to drink more     Hypertension, on medical treatment. Seems to be under good control. Patient is compliant with medical treatment. On lisinopril 5 mg po qd     Hyperlipidemia: on statins, followed periodically. Patient need periodic lipid and liver profile.     D/w the pat AT LENGTH  the plan of care    Hx of factor Vleiden AND hX OF dvt  On xeralto    OVERALL FEEL GOOD AND MUCH BETTER AFTER PACER    Discussed use, benefit, and side effects of prescribed medications. All patient questions answered. Pt voiced understanding. Instructed to continue current medications, diet and exercise. Continue risk factor modification and medical management. Patient agreed with treatment plan. Follow up as directed.       RTC in 6 months      [facility] Luc[facility] IU/L    GFR Non-African American >60 >60 mL/min/1.73m2    GFR African American >60 >60 mL/min/1.73m2    Anion Gap 15 4 - 16   Troponin   Result Value Ref Range    Troponin T <0.010 <0.01 NG/ML   Brain Natriuretic Peptide   Result Value Ref Range    Pro-.1 <300 PG/ML   EKG 12 Lead   Result Value Ref Range    Ventricular Rate 74 BPM    Atrial Rate 74 BPM    P-R Interval 166 ms    QRS Duration 94 ms    Q-T Interval 398 ms    QTc Calculation (Bazett) 441 ms    P Axis 66 degrees    R Axis 23 degrees    T Axis 28 degrees    Diagnosis       Normal sinus rhythm  Possible Left atrial enlargement  Cannot rule out Anterior infarct , age undetermined  Abnormal ECG  When compared with ECG of 26-JAN-2021 04:03,  Minimal criteria for Anterior infarct are now present        Radiographs (if obtained):  [] The following radiograph was interpreted by myself in the absence of a radiologist:  [] Radiologist's Report Reviewed:    EKG (if obtained): (All EKG's are interpreted by myself in the absence of a cardiologist)  12 lead EKG as interpreted by me reveals normal sinus rhythm. Axis is normal. There are no ischemic ST elevations or other suspicious ST changes;  QRS interval is narrow, QT interval is not prolonged. Final interpretation: Normal sinus rhythm. MDM:  Plan of care is discussed thoroughly with the patient and family if present. If performed, all imaging and lab work also discussed with patient. All relevant prior results and chart reviewed if available. Patient presents as above with acute onset and shortness of breath, throat tightness. Oxygen saturation on room air is 100%. Vital signs are normal except for some mild hypertension. She appears anxious and is hyperventilating. She has no stridor, drooling, trismus. No evidence of acute airway obstruction at this time. Lung sounds are clear bilaterally. Low suspicion for acute heart failure, pneumonia, pulmonary embolism, pneumothorax.   Suspect possible underlying anxiety but plan to rule out other acute emergent processes as well. On multiple reevaluations, the patient is sleeping comfortably without any increase in respiratory effort. Oxygenation remains normal.  When I do wake the patient up, she appears to be slightly anxious but voice is now returning. States that she feels like her throat is tight. There is nothing on exam that is indicative of this time of impending airway obstruction or rapidly evolving infectious process. Imaging does not show any evidence of acute abnormality. The patient's metabolic work-up is unremarkable. Troponin was within normal limits. Low suspicion at this time for any acute emergent process. Patient will be discharged and instructed to return immediately if she has worsening symptoms. She is agreeable with this plan of care. Clinical Impression:  1.  Dyspnea and respiratory abnormalities      (Please note that portions of this note may have been completed with a voice recognition program. Efforts were made to edit the dictations but occasionally words are mis-transcribed.)    MD Cyndi Michaud MD  02/02/22 [id number]

## 2022-03-11 ENCOUNTER — OFFICE VISIT (OUTPATIENT)
Dept: CARDIOLOGY CLINIC | Age: 67
End: 2022-03-11
Payer: COMMERCIAL

## 2022-03-11 VITALS
WEIGHT: 281.8 LBS | HEART RATE: 80 BPM | SYSTOLIC BLOOD PRESSURE: 136 MMHG | DIASTOLIC BLOOD PRESSURE: 76 MMHG | BODY MASS INDEX: 34.32 KG/M2 | HEIGHT: 76 IN

## 2022-03-11 DIAGNOSIS — R94.31 ABNORMAL EKG: ICD-10-CM

## 2022-03-11 DIAGNOSIS — I45.2 RBBB (RIGHT BUNDLE BRANCH BLOCK WITH LEFT ANTERIOR FASCICULAR BLOCK): ICD-10-CM

## 2022-03-11 DIAGNOSIS — E78.00 PURE HYPERCHOLESTEROLEMIA: ICD-10-CM

## 2022-03-11 DIAGNOSIS — Z95.0 S/P CARDIAC PACEMAKER PROCEDURE: ICD-10-CM

## 2022-03-11 DIAGNOSIS — I10 ESSENTIAL HYPERTENSION: Primary | ICD-10-CM

## 2022-03-11 DIAGNOSIS — Z87.898 HISTORY OF SYNCOPE: ICD-10-CM

## 2022-03-11 DIAGNOSIS — R42 POSTURAL DIZZINESS: ICD-10-CM

## 2022-03-11 PROCEDURE — 99214 OFFICE O/P EST MOD 30 MIN: CPT | Performed by: INTERNAL MEDICINE

## 2022-03-11 PROCEDURE — 1036F TOBACCO NON-USER: CPT | Performed by: INTERNAL MEDICINE

## 2022-03-11 PROCEDURE — 3017F COLORECTAL CA SCREEN DOC REV: CPT | Performed by: INTERNAL MEDICINE

## 2022-03-11 PROCEDURE — G8484 FLU IMMUNIZE NO ADMIN: HCPCS | Performed by: INTERNAL MEDICINE

## 2022-03-11 PROCEDURE — G8427 DOCREV CUR MEDS BY ELIG CLIN: HCPCS | Performed by: INTERNAL MEDICINE

## 2022-03-11 PROCEDURE — G8417 CALC BMI ABV UP PARAM F/U: HCPCS | Performed by: INTERNAL MEDICINE

## 2022-03-11 NOTE — PROGRESS NOTES
Chief Complaint   Patient presents with    Follow-up    Hypertension   Originally  patient referred for bradycardia. And need pacer and had pacer already        5 month follow up. EKG done 10-. Sob  On exertion on mowing better now    Denied cp, dizziness, palpitation or edema    Sob and dizziness improved after pacer  Now worsening of sob    Had more energy after pacer and more active    Occasional dizziness on getting up fast- for years\alex after pacer    Had syncope-late July 2020  predrome-NOTHING SIGNIFICANT  VERY SHORT LASTING dizziness  NO Injury  Did not seek medical advise    postdrome- NONE  No symptom of nause, or sweating or feeling tired  WAKE UP FROM SYNCOPE AND BACK TO BASELINE  Mid august slipped and fall with no LOC, was on pain medication    Hx of Bradycardia and rate of 33  BPM noted at a home    Had cardiac cath in [place] 5 yrs back and known to have LBBB then as well    nevere smoked    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  Mother had CHF,  None for cAD or pacer need    Patient Active Problem List   Diagnosis    AV block, 2nd degree with 2: 1 AV block    RBBB (right bundle branch block with left anterior fascicular block)    Bradycardia    History of syncope    Postural dizziness    Essential hypertension    Pure hypercholesterolemia    S/P cardiac pacemaker procedure 10/2020    SOB (shortness of breath) on exertion    Abnormal EKG       Past Surgical History:   Procedure Laterality Date    CARDIAC PACEMAKER PLACEMENT  10/08/2020    CHOLECYSTECTOMY      GALLBLADDER SURGERY      PROSTATE SURGERY  2015       No Known Allergies     History reviewed. No pertinent family history.      Social History     Socioeconomic History    Marital status:      Spouse name: Sara Bautista Number of children: 2    Years of education: 12 years    Highest education level: Not on file   Occupational History    Not on file   Tobacco Use    Smoking status: Never Smoker    Smokeless tobacco: Never Used   Vaping Use    Vaping Use: Never used   Substance and Sexual Activity    Alcohol use: Yes     Comment: socially    Drug use: Never    Sexual activity: Not on file   Other Topics Concern    Not on file   Social History Narrative    Not on file     Social Determinants of Health     Financial Resource Strain:     Difficulty of Paying Living Expenses: Not on file   Food Insecurity:     Worried About Running Out of Food in the Last Year: Not on file    Leida of Food in the Last Year: Not on file   Transportation Needs:     Lack of Transportation (Medical): Not on file    Lack of Transportation (Non-Medical):  Not on file   Physical Activity:     Days of Exercise per Week: Not on file    Minutes of Exercise per Session: Not on file   Stress:     Feeling of Stress : Not on file   Social Connections:     Frequency of Communication with Friends and Family: Not on file    Frequency of Social Gatherings with Friends and Family: Not on file    Attends Buddhism Services: Not on file    Active Member of [address] [place] [address] or Organizations: Not on file    Attends Club or Organization Meetings: Not on file    Marital Status: Not on file   Intimate Partner Violence:     Fear of Current or Ex-Partner: Not on file    Emotionally Abused: Not on file    Physically Abused: Not on file    Sexually Abused: Not on file   Housing Stability:     Unable to Pay for Housing in the Last Year: Not on file    Number of [place] in the Last Year: Not on file    Unstable Housing in the Last Year: Not on file       Current Outpatient Medications   Medication Sig Dispense Refill    b complex vitamins capsule Take 2 capsules by mouth daily      Flaxseed, Linseed, (FLAX SEEDS PO) Take 2 tablets by mouth daily      rivaroxaban (XARELTO) 20 MG TABS tablet Take by mouth daily       atorvastatin (LIPITOR) 20 MG tablet Take 20 mg by mouth daily       lisinopril (PRINIVIL;ZESTRIL) 5 MG tablet Take 5 mg by mouth daily        No current facility-administered medications for this visit. Review of Systems -     General ROS: negative  Psychological ROS: negative  Hematological and Lymphatic ROS: No history of blood clots or bleeding disorder. Respiratory ROS: no cough,  or wheezing, the rest see HPI  Cardiovascular ROS: See HPI  Gastrointestinal ROS: negative  Genito-Urinary ROS: no dysuria, trouble voiding, or hematuria  Musculoskeletal ROS: negative  Neurological ROS: no TIA or stroke symptoms  Dermatological ROS: negative      Blood pressure 136/76, pulse 80, height 6' 4\" (1.93 m), weight 281 lb 12.8 oz (127.8 kg). Physical Examination:    General appearance - alert, well appearing, and in no distress  HEENT- Pink conjunctiva  , Non-icteri sclera,PERRLA  Mental status - alert, oriented to person, place, and time  Neck - supple, no significant adenopathy, no JVD, or carotid bruits  Chest - clear to auscultation, no wheezes, rales or rhonchi, symmetric air entry  Heart - normal rate, regular rhythm, normal S1, S2, no murmurs, rubs, clicks or gallops  Abdomen - soft, nontender, nondistended, no masses or organomegaly  TERRY- no CVA or flank tenderness, no suprapubic tenderness  Neurological - alert, oriented, normal speech, no focal findings or movement disorder noted  Musculoskeletal/limbs - no joint tenderness, deformity or swelling   - peripheral pulses normal, no pedal edema, no clubbing or cyanosis  Skin - normal coloration and turgor, no rashes, no suspicious skin lesions noted  Psych- appropriate mood and affect    Lab  No results for input(s): CKTOTAL, CKMB, CKMBINDEX, TROPONINI in the last 72 hours.   CBC:   Lab Results   Component Value Date    WBC 7.7 10/08/2020    RBC 5.70 10/08/2020    HGB 15.5 10/08/2020    HCT 49.4 10/08/2020    MCV 86.7 10/08/2020    MCH 27.2 10/08/2020    MCHC 31.4 10/08/2020     10/08/2020    MPV 10.8 10/08/2020     BMP:    Lab Results Component Value Date     10/08/2020    K 5.0 10/08/2020     10/08/2020    CO2 27 10/08/2020    BUN 21 10/08/2020    CREATININE 1.1 10/08/2020    CALCIUM 9.6 10/08/2020    LABGLOM 68 10/08/2020    GLUCOSE 103 10/08/2020     Hepatic Function Panel:  No results found for: ALKPHOS, ALT, AST, PROT, BILITOT, BILIDIR, IBILI, LABALBU  Magnesium:  No results found for: MG  Warfarin PT/INR:  No components found for: PTPATWAR, PTINRWAR  HgBA1c:  No results found for: LABA1C  FLP:  No results found for: TRIG, HDL, LDLCALC, LDLDIRECT, LABVLDL  TSH:  No results found for: TSH      ekg 10/5/2020  NSR, 2: 1 AV block, RBBB, RUTHANN, Bifascicular block, QRS duration 166 msec      Echo   Conclusions      Summary   Left Ventricular size is Mildly increased . Normal left ventricular wall thickness. Systolic function was normal.   There were no regional wall motion abnormalities. Ejection fraction is visually estimated at 60%. Doppler parameters were consistent with abnormal left ventricular   relaxation (grade 1 diastolic dysfunction). The left atrium is Mildly dilated. Signature      ----------------------------------------------------------------   Electronically signed by Mil Louis MD (Interpreting   physician) on 10/06/2020 at 06:03 PM   ----------------------------------------------------------------    Conclusions      Summary   Normal left ventricle size and lOW nORMAL LV systolic function. Ejection   fraction was estimated at 50 %. There were no regional left ventricular   wall motion abnormalities and wall thickness was within normal limits. Doppler parameters were consistent with abnormal left ventricular   relaxation (grade 1 diastolic dysfunction).       Signature      ----------------------------------------------------------------   Electronically signed by Mil Louis MD (Interpreting   physician) on 10/21/2021 at 07:06 PM ----------------------------------------------------------------     Conclusions      Summary   Lexiscan EKG stress test is not suggestive for ischemia. The nuclear images is not suggestive for myocardial ischemia. Signatures      ----------------------------------------------------------------   Electronically signed by Anthony Lam MD (Interpreting   Cardiologist) on 10/21/2021 at 18:06        Conclusions      Summary   Lexiscan EKG stress test is non diagnostic for ischemia due to baseline   LBBB   Calculated gated LVEF 44 %. The nuclear images is not suggestive for myocardial ischemia. Signatures      ----------------------------------------------------------------   Electronically signed by Anthony Lam MD (Interpreting   Cardiologist) on 11/17/2020 at 18:47   ----------------------------------------------------------------    48 Hours  holter     DIARY *  Symptom correlates with Normal sinus rhythm     CONCLUSION:  *     Normal Sinus rhythm    Average Heart Rate74 BPM Range from 59 bpm to 141 bpm   Frequent Premature atrial complexes -2%  Frequent Premature ventricular complexes -3%  Occasional PACED RHYTHM  No long pause or profound bradycardia  No Supraventricular Tachycardia   No Atrial Fibrillation    Clinical correlation indicated        Confirmed by Brenda Garcia MD, [facility]ling ([id number]) on 11/29/2020 6:41:48 PM    ekg 10/11/21  Electronic ventricular pacemaker   Pacemaker ECG, No further analysis   INSUFFICIENT DATA    Assessment    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 Essential hypertension     2. Pure hypercholesterolemia     3. RBBB (right bundle branch block with left anterior fascicular block)     4. Postural dizziness     5. S/P cardiac pacemaker procedure 10/2020     6. History of syncope     7.  Abnormal EKG           Plan     THE most

## 2022-04-18 ENCOUNTER — PROCEDURE VISIT (OUTPATIENT)
Dept: CARDIOLOGY CLINIC | Age: 67
End: 2022-04-18
Payer: COMMERCIAL

## 2022-04-18 DIAGNOSIS — Z95.0 S/P CARDIAC PACEMAKER PROCEDURE: Primary | ICD-10-CM

## 2022-04-18 NOTE — PROGRESS NOTES
[facility] Medtronic Dual Pacemaker   Patient of  Mike    Battery 10.5 years    Presenting rhythm AP     A Impedance 475  RV Impedance 418    P wave sensing 3.8  R wave sensing 7.3    A Threshold 0.875 @ 0.40  A Amplitude 1.75 @ 0.40  RV Thresholds 0.50 @ 0.40  RV Amplitude 2.0 @ 0.40      A Paced 37.7%  V Paced 91.9%    Programmed Mode DDDR       Afib [place] 0%    Episodes   2/4/22-30 seconds SVT rate 154

## 2022-04-19 PROCEDURE — 93294 REM INTERROG EVL PM/LDLS PM: CPT | Performed by: INTERNAL MEDICINE

## 2022-04-19 PROCEDURE — 93296 REM INTERROG EVL PM/IDS: CPT | Performed by: INTERNAL MEDICINE

## 2022-05-03 ENCOUNTER — OFFICE VISIT (OUTPATIENT)
Dept: CARDIOLOGY CLINIC | Age: 67
End: 2022-05-03
Payer: COMMERCIAL

## 2022-05-03 VITALS
WEIGHT: 265.6 LBS | HEIGHT: 76 IN | DIASTOLIC BLOOD PRESSURE: 75 MMHG | SYSTOLIC BLOOD PRESSURE: 137 MMHG | HEART RATE: 73 BPM | BODY MASS INDEX: 32.34 KG/M2

## 2022-05-03 DIAGNOSIS — R94.31 ABNORMAL EKG: Primary | ICD-10-CM

## 2022-05-03 DIAGNOSIS — Z87.898 HISTORY OF SYNCOPE: ICD-10-CM

## 2022-05-03 DIAGNOSIS — I45.2 RBBB (RIGHT BUNDLE BRANCH BLOCK WITH LEFT ANTERIOR FASCICULAR BLOCK): ICD-10-CM

## 2022-05-03 DIAGNOSIS — E78.00 PURE HYPERCHOLESTEROLEMIA: ICD-10-CM

## 2022-05-03 DIAGNOSIS — Z95.0 S/P CARDIAC PACEMAKER PROCEDURE: ICD-10-CM

## 2022-05-03 DIAGNOSIS — I10 ESSENTIAL HYPERTENSION: ICD-10-CM

## 2022-05-03 LAB
BUN BLDV-MCNC: 23 MG/DL
CALCIUM SERPL-MCNC: 9.9 MG/DL
CHLORIDE BLD-SCNC: 101 MMOL/L
CO2: 26 MMOL/L
CREAT SERPL-MCNC: 1.4 MG/DL
GFR CALCULATED: 5.3
GLUCOSE BLD-MCNC: 133 MG/DL
MAGNESIUM: 2.1 MG/DL
POTASSIUM SERPL-SCNC: 4.7 MMOL/L
SODIUM BLD-SCNC: 140 MMOL/L

## 2022-05-03 PROCEDURE — 93000 ELECTROCARDIOGRAM COMPLETE: CPT | Performed by: INTERNAL MEDICINE

## 2022-05-03 PROCEDURE — G8417 CALC BMI ABV UP PARAM F/U: HCPCS | Performed by: INTERNAL MEDICINE

## 2022-05-03 PROCEDURE — G8427 DOCREV CUR MEDS BY ELIG CLIN: HCPCS | Performed by: INTERNAL MEDICINE

## 2022-05-03 PROCEDURE — 99214 OFFICE O/P EST MOD 30 MIN: CPT | Performed by: INTERNAL MEDICINE

## 2022-05-03 PROCEDURE — 3017F COLORECTAL CA SCREEN DOC REV: CPT | Performed by: INTERNAL MEDICINE

## 2022-05-03 PROCEDURE — 1036F TOBACCO NON-USER: CPT | Performed by: INTERNAL MEDICINE

## 2022-05-03 RX ORDER — METOPROLOL SUCCINATE 25 MG/1
25 TABLET, EXTENDED RELEASE ORAL DAILY
Qty: 30 TABLET | Refills: 3 | Status: SHIPPED | OUTPATIENT
Start: 2022-05-03 | End: 2022-08-30 | Stop reason: SDUPTHER

## 2022-05-03 RX ORDER — PREDNISONE 10 MG/1
TABLET ORAL
COMMUNITY
Start: 2022-04-29 | End: 2022-10-25

## 2022-05-03 RX ORDER — GABAPENTIN 300 MG/1
CAPSULE ORAL
COMMUNITY
Start: 2022-04-21

## 2022-05-03 NOTE — PROGRESS NOTES
Chief Complaint   Patient presents with    Follow-up   Originally  patient referred for bradycardia. And need pacer and had pacer already        Follow for Heart rate concerns. Hr was lower and counted to 50 bpm at pcp office and they did ekg 75 bpm  Patient states he had an EKG done last Friday because of the 50 bmp HR and on the EKG it showed 75 bpm.    Dizziness on bending over     No calf or thigh pain on walking    Sob  On exertion on mowing better now    Denied cp, dizziness, palpitation or edema    Sob and dizziness resolved after pacer    Had more energy after pacer and more active    Occasional dizziness on getting up fast- for years-resolved after pacer    Had syncope-late July 2020  predrome-NOTHING SIGNIFICANT  VERY SHORT LASTING dizziness  NO Injury  Did not seek medical advise    postdrome- NONE  No symptom of nause, or sweating or feeling tired  WAKE UP FROM SYNCOPE AND BACK TO BASELINE  Mid august slipped and fall with no LOC, was on pain medication    Hx of Bradycardia and rate of 33  BPM noted at a home    Had cardiac cath in [facility] 5 yrs back and known to have LBBB then as well    nevere smoked    Factor V lieden and Hx of DVT 10 yrs back- and on [facility]  Mother had CHF,  None for cAD or pacer need    Patient Active Problem List   Diagnosis    AV block, 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 No pertinent family history.      Social History     Socioeconomic History    Marital status:      Spouse name: Laurence Rodriguez Number of children: 2    Years of education: 12 years    Highest education level: Not on file   Occupational History    Not on file   Tobacco Use    Smoking status: Never Smoker    Smokeless tobacco: Never Used   Vaping Use    Vaping Use: Never used   Substance and Sexual Activity    Alcohol use: Yes     Comment: socially    Drug use: Never    Sexual activity: Not on file   Other Topics Concern    Not on file   Social History Narrative    Not on file     Social Determinants of Health     Financial Resource Strain:     Difficulty of Paying Living Expenses: Not on file   Food Insecurity:     Worried About Running Out of Food in the Last Year: Not on file    Leida of Food in the Last Year: Not on file   Transportation Needs:     Lack of Transportation (Medical): Not on file    Lack of Transportation (Non-Medical):  Not on file   Physical Activity:     Days of Exercise per Week: Not on file    Minutes of Exercise per Session: Not on file   Stress:     Feeling of Stress : Not on file   Social Connections:     Frequency of Communication with Friends and Family: Not on file    Frequency of Social Gatherings with Friends and Family: Not on file    Attends Lutheran Services: Not on file    Active Member of [address] [place] [address] [place] or Organizations: Not on file    Attends Club or Organization Meetings: Not on file    Marital Status: Not on file   Intimate Partner Violence:     Fear of Current or Ex-Partner: Not on file    Emotionally Abused: Not on file    Physically Abused: Not on file    Sexually Abused: Not on file   Housing Stability:     Unable to Pay for Housing in the Last Year: Not on file    Number of [place] in the Last Year: Not on file    Unstable Housing in the Last Year: Not on file       Current Outpatient Medications   Medication Sig Dispense Refill    gabapentin (NEURONTIN) 300 MG capsule take 1 capsule by mouth IN THE EVENING ON THE FIRST DAY THEN 1 CA. ..  (REFER TO PRESCRIPTION NOTES).  predniSONE (DELTASONE) 10 MG tablet take 4 tablets by mouth once daily for 2 days THEN TAKE 3 TABLETS. ..  (REFER TO PRESCRIPTION NOTES).  metoprolol succinate (TOPROL XL) 25 MG extended release tablet Take 1 tablet by mouth daily 30 tablet 3    b complex vitamins capsule Take 2 capsules by mouth daily      Flaxseed, Linseed, (FLAX SEEDS PO) Take 2 tablets by mouth daily      rivaroxaban (XARELTO) 20 MG TABS tablet Take by mouth daily       atorvastatin (LIPITOR) 20 MG tablet Take 20 mg by mouth daily       lisinopril (PRINIVIL;ZESTRIL) 5 MG tablet Take 5 mg by mouth daily        No current facility-administered medications for this visit. Review of Systems -     General ROS: negative  Psychological ROS: negative  Hematological and Lymphatic ROS: No history of blood clots or bleeding disorder. Respiratory ROS: no cough,  or wheezing, the rest see HPI  Cardiovascular ROS: See HPI  Gastrointestinal ROS: negative  Genito-Urinary ROS: no dysuria, trouble voiding, or hematuria  Musculoskeletal ROS: negative  Neurological ROS: no TIA or stroke symptoms  Dermatological ROS: negative      Blood pressure 137/75, pulse 73, height 6' 4\" (1.93 m), weight 265 lb 9.6 oz (120.5 kg).         Physical Examination:    General appearance - alert, well appearing, and in no distress  HEENT- Pink conjunctiva  , Non-icteri sclera,PERRLA  Mental status - alert, oriented to person, place, and time  Neck - supple, no significant adenopathy, no JVD, or carotid bruits  Chest - clear to auscultation, no wheezes, rales or rhonchi, symmetric air entry  Heart - normal rate, regular rhythm, normal S1, S2, no murmurs, rubs, clicks or gallops  Abdomen - soft, nontender, nondistended, no masses or organomegaly  TERRY- no CVA or flank tenderness, no suprapubic tenderness  Neurological - alert, oriented, normal speech, no focal findings or movement disorder noted  Musculoskeletal/limbs - no joint tenderness, deformity or swelling   - peripheral pulses normal, no pedal edema, no clubbing or cyanosis  Skin - normal coloration and turgor, no rashes, no suspicious skin lesions noted  Psych- appropriate mood and affect    Lab  No results for input(s): CKTOTAL, CKMB, CKMBINDEX, TROPONINI in the last 72 hours. CBC:   Lab Results   Component Value Date    WBC 7.7 10/08/2020    RBC 5.70 10/08/2020    HGB 15.5 10/08/2020    HCT 49.4 10/08/2020    MCV 86.7 10/08/2020    MCH 27.2 10/08/2020    MCHC 31.4 10/08/2020     10/08/2020    MPV 10.8 10/08/2020     BMP:    Lab Results   Component Value Date     10/08/2020    K 5.0 10/08/2020     10/08/2020    CO2 27 10/08/2020    BUN 21 10/08/2020    CREATININE 1.1 10/08/2020    CALCIUM 9.6 10/08/2020    LABGLOM 68 10/08/2020    GLUCOSE 103 10/08/2020     Hepatic Function Panel:  No results found for: ALKPHOS, ALT, AST, PROT, BILITOT, BILIDIR, IBILI, LABALBU  Magnesium:  No results found for: MG  Warfarin PT/INR:  No components found for: PTPATWAR, PTINRWAR  HgBA1c:  No results found for: LABA1C  FLP:  No results found for: TRIG, HDL, LDLCALC, LDLDIRECT, LABVLDL  TSH:  No results found for: TSH      ekg 10/5/2020  NSR, 2: 1 AV block, RBBB, RUTHANN, Bifascicular block, QRS duration 166 msec      Echo   Conclusions      Summary   Left Ventricular size is Mildly increased . Normal left ventricular wall thickness. Systolic function was normal.   There were no regional wall motion abnormalities. Ejection fraction is visually estimated at 60%. Doppler parameters were consistent with abnormal left ventricular   relaxation (grade 1 diastolic dysfunction). The left atrium is Mildly dilated.       Signature      ----------------------------------------------------------------   Electronically signed by Rodrigo Maza MD (Interpreting   physician) on 10/06/2020 at 06:03 PM ----------------------------------------------------------------    Conclusions      Summary   Normal left ventricle size and lOW nORMAL LV systolic function. Ejection   fraction was estimated at 50 %. There were no regional left ventricular   wall motion abnormalities and wall thickness was within normal limits. Doppler parameters were consistent with abnormal left ventricular   relaxation (grade 1 diastolic dysfunction). Signature      ----------------------------------------------------------------   Electronically signed by George Browning MD (Interpreting   physician) on 10/21/2021 at 07:06 PM   ----------------------------------------------------------------     Conclusions      Summary   Lexiscan EKG stress test is not suggestive for ischemia. The nuclear images is not suggestive for myocardial ischemia. Signatures      ----------------------------------------------------------------   Electronically signed by George Browning MD (Interpreting   Cardiologist) on 10/21/2021 at 18:06        Conclusions      Summary   Lexiscan EKG stress test is non diagnostic for ischemia due to baseline   LBBB   Calculated gated LVEF 44 %. The nuclear images is not suggestive for myocardial ischemia.       Signatures      ----------------------------------------------------------------   Electronically signed by George Browning MD (Interpreting   Cardiologist) on 11/17/2020 at 18:47   ----------------------------------------------------------------    48 Hours  holter     DIARY *  Symptom correlates with Normal sinus rhythm     CONCLUSION:  *     Normal Sinus rhythm    Average Heart Rate74 BPM Range from 59 bpm to 141 bpm   Frequent Premature atrial complexes -2%  Frequent Premature ventricular complexes -3%  Occasional PACED RHYTHM  No long pause or profound bradycardia  No Supraventricular Tachycardia   No Atrial Fibrillation    Clinical correlation indicated        Confirmed by Cindy Parekh MD, [facility] ([id number]) on 11/29/2020 6:41:48 PM    ekg 10/11/21  Electronic ventricular pacemaker   Pacemaker ECG, No further analysis   INSUFFICIENT DATA    ekg 5/3/22  NSR with freq PVCs, pvcs in trigeminy    Assessment    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 Abnormal EKG freq PVCs in trigeminy     2. Essential hypertension  EKG 12 Lead    Basic Metabolic Panel    Magnesium   3. Pure hypercholesterolemia  EKG 12 Lead    Basic Metabolic Panel    Magnesium   4. RBBB (right bundle branch block with left anterior fascicular block)  EKG 12 Lead    Basic Metabolic Panel    Magnesium   5. S/P cardiac pacemaker procedure 10/2020  EKG 12 Lead    Basic Metabolic Panel    Magnesium   6. History of syncope  EKG 12 Lead    Basic Metabolic Panel    Magnesium     abn ekg with PVCs in [facility]    Plan     THE most CURRENT meds and labs reviewed    Continue the current treatment and with constant vigilance to changes in symptoms and also any potential side effects. Return for care or seek medical attention immediately if symptoms got worse and/or develop new symptoms. Svt in the recent pacer interrogation  abn ekg with PVCs in [facility]  The presume low HR was due to pvcs  Has new dietary changes  Echo  BMP and mG  Start toprol xl 25 po qd      Hx of SYMPTOMATIC bradyarrhythmia WITH 2ND DEGREE av BLOCK AND BIFASCICULAR BLOCK) AND RECENT HX OF SYNCOPE- APPEARS CARDIAC CAUSE AND INTERMITTENT DIZZINESS   Bifascicular block with 2:1 AV block- 2nd degree  VENTRICULAR RATE 44  Hx of dizziness  Hx of syncope  Had pacer  Pacer  Interrogation  April 2022.   Battery 10.5 years     Presenting rhythm AP      A Impedance 475  RV Impedance 418     P wave sensing 3.8  R wave sensing 7.3     A Threshold 0.875 @ 0.40  A Amplitude 1.75 @ 0.40  RV Thresholds 0.50 @ 0.40  RV Amplitude 2.0 @ 0.40        A Paced 37.7%  V Paced 91.9%     Programmed Mode DDDR         Afib [place] 0%     Episodes   2/4/22-30 seconds SVT rate 154   no new action for nonsustained SVT for 30 sec      Sob on exertion on exertion wrose  On radial pulse a lots of pulse deficit  With pvcs  ABN  HOLter 48 hrs- FREQ PVC AND PACS  Echo and ex nuc-WNL    HX OF Postural dizziness for yrs  MUCH Better after pacer  Bedside orthostatic eval negative  32 oz a day  Advised to drink more     Hypertension, on medical treatment. Seems to be under good control. Patient is compliant with medical treatment. On lisinopril 5 mg po qd     Hyperlipidemia: on statins, followed periodically. Patient need periodic lipid and liver profile. pcp follow    D/w the pat AT LENGTH  the plan of care    Hx of factor Vleiden AND hX OF dvt  On xeralto    OVERALL FEEL GOOD AND MUCH BETTER AFTER PACER    Discussed use, benefit, and side effects of prescribed medications. All patient questions answered. Pt voiced understanding. Instructed to continue current medications, diet and exercise. Continue risk factor modification and medical management. Patient agreed with treatment plan. Follow up as directed.       RTC in 6 months      Ingrid Perez, [facility]

## 2022-07-29 ENCOUNTER — PROCEDURE VISIT (OUTPATIENT)
Dept: CARDIOLOGY CLINIC | Age: 67
End: 2022-07-29
Payer: COMMERCIAL

## 2022-07-29 DIAGNOSIS — Z95.0 S/P CARDIAC PACEMAKER PROCEDURE: Primary | ICD-10-CM

## 2022-07-29 PROCEDURE — 93296 REM INTERROG EVL PM/IDS: CPT | Performed by: INTERNAL MEDICINE

## 2022-07-29 PROCEDURE — 93294 REM INTERROG EVL PM/LDLS PM: CPT | Performed by: INTERNAL MEDICINE

## 2022-07-29 NOTE — PROGRESS NOTES
[employer] medtronic dual pacer     . Adrienne Marko Battery longevity:  10.1 years on device   Presenting rhythm  AP  w/ PVC's    Atrial impedance 437  RV impedance 399    P wave sensing 1.3  R wave sensing 9.4    76.4 % atrial paced  83.6 % RV paced     Atrial threshold 0.75 V  at 0.4ms  RV threshold 0.625 V at 0.4ms  Mode switches   <0.1

## 2022-08-30 ENCOUNTER — TELEPHONE (OUTPATIENT)
Dept: CARDIOLOGY CLINIC | Age: 67
End: 2022-08-30

## 2022-08-30 RX ORDER — METOPROLOL SUCCINATE 25 MG/1
25 TABLET, EXTENDED RELEASE ORAL DAILY
Qty: 30 TABLET | Refills: 3 | Status: SHIPPED | OUTPATIENT
Start: 2022-08-30

## 2022-10-25 ENCOUNTER — OFFICE VISIT (OUTPATIENT)
Dept: CARDIOLOGY CLINIC | Age: 67
End: 2022-10-25
Payer: COMMERCIAL

## 2022-10-25 VITALS
DIASTOLIC BLOOD PRESSURE: 84 MMHG | HEIGHT: 76 IN | WEIGHT: 273.8 LBS | SYSTOLIC BLOOD PRESSURE: 124 MMHG | BODY MASS INDEX: 33.34 KG/M2 | HEART RATE: 58 BPM

## 2022-10-25 DIAGNOSIS — I10 ESSENTIAL HYPERTENSION: Primary | ICD-10-CM

## 2022-10-25 DIAGNOSIS — Z87.898 HISTORY OF SYNCOPE: ICD-10-CM

## 2022-10-25 DIAGNOSIS — E78.00 PURE HYPERCHOLESTEROLEMIA: ICD-10-CM

## 2022-10-25 DIAGNOSIS — R94.31 ABNORMAL EKG: ICD-10-CM

## 2022-10-25 DIAGNOSIS — Z95.0 S/P CARDIAC PACEMAKER PROCEDURE: ICD-10-CM

## 2022-10-25 PROBLEM — R06.02 SOB (SHORTNESS OF BREATH) ON EXERTION: Status: RESOLVED | Noted: 2020-11-09 | Resolved: 2022-10-25

## 2022-10-25 PROCEDURE — 1036F TOBACCO NON-USER: CPT | Performed by: INTERNAL MEDICINE

## 2022-10-25 PROCEDURE — G8484 FLU IMMUNIZE NO ADMIN: HCPCS | Performed by: INTERNAL MEDICINE

## 2022-10-25 PROCEDURE — G8427 DOCREV CUR MEDS BY ELIG CLIN: HCPCS | Performed by: INTERNAL MEDICINE

## 2022-10-25 PROCEDURE — G8417 CALC BMI ABV UP PARAM F/U: HCPCS | Performed by: INTERNAL MEDICINE

## 2022-10-25 PROCEDURE — 3017F COLORECTAL CA SCREEN DOC REV: CPT | Performed by: INTERNAL MEDICINE

## 2022-10-25 PROCEDURE — 99214 OFFICE O/P EST MOD 30 MIN: CPT | Performed by: INTERNAL MEDICINE

## 2022-10-25 RX ORDER — LISINOPRIL 5 MG/1
5 TABLET ORAL DAILY
COMMUNITY

## 2022-10-25 NOTE — PROGRESS NOTES
Chief Complaint   Patient presents with    Follow-up     5 MONTH FOLLOW UP   Originally  patient referred for bradycardia. And need pacer and had pacer already          Pt is here for: 5 MONTH FOLLOW UP    Last EKG was done on: 05/03/2022    Dizziness on bending over  and once in a while on getting up     No calf or thigh pain on walking    Sob  On exertion on mowing better now    Denied cp, dizziness, palpitation or edema    Sob and dizziness resolved after pacer    Had more energy after pacer and more active    Occasional dizziness on getting up fast- for years-resolved after pacer    Had syncope-late July 2020  predrome-NOTHING SIGNIFICANT  VERY SHORT LASTING dizziness  NO Injury  Did not seek medical advise    postdrome- NONE  No symptom of nause, or sweating or feeling tired  WAKE UP FROM SYNCOPE AND BACK TO BASELINE  Mid august slipped and fall with no LOC, was on pain medication    Hx of Bradycardia and rate of 33  BPM noted at a home    Had cardiac cath in [facility] 5 yrs back and known to have LBBB then as well    nevere smoked    Factor V lieden and Hx of DVT 10 yrs back- and on [facility]  Mother had CHF,  None for cAD or pacer need    Patient Active Problem List   Diagnosis    AV block, 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 No pertinent family history.      Social History     Socioeconomic History    Marital status:      Spouse name: Zuly Mcgill    Number of children: 2    Years of education: 12 years    Highest education level: Not on file   Occupational History    Not on file   Tobacco Use    Smoking status: Never    Smokeless tobacco: Never   Vaping Use    Vaping Use: Never used   Substance and Sexual Activity    Alcohol use: Yes     Comment: socially    Drug use: Never    Sexual activity: Not on file   Other Topics Concern    Not on file   Social History Narrative    Not on file     Social Determinants of Health     Financial Resource Strain: Not on file   Food Insecurity: Not on file   Transportation Needs: Not on file   Physical Activity: Not on file   Stress: Not on file   Social Connections: Not on file   Intimate Partner Violence: Not on file   Housing Stability: Not on file       Current Outpatient Medications   Medication Sig Dispense Refill    rivaroxaban (XARELTO) 20 MG TABS tablet Take 20 mg by mouth daily      lisinopril (PRINIVIL;ZESTRIL) 5 MG tablet Take 5 mg by mouth daily      metoprolol succinate (TOPROL XL) 25 MG extended release tablet Take 1 tablet by mouth daily 30 tablet 3    gabapentin (NEURONTIN) 300 MG capsule take 1 capsule by mouth IN THE EVENING ON THE FIRST DAY THEN 1 CA. ..  (REFER TO PRESCRIPTION NOTES). b complex vitamins capsule Take 2 capsules by mouth daily      Flaxseed, Linseed, (FLAX SEEDS PO) Take 2 tablets by mouth daily      atorvastatin (LIPITOR) 20 MG tablet Take 20 mg by mouth daily        No current facility-administered medications for this visit. Review of Systems -     General ROS: negative  Psychological ROS: negative  Hematological and Lymphatic ROS: No history of blood clots or bleeding disorder.    Respiratory ROS: no cough,  or wheezing, the rest see HPI  Cardiovascular ROS: See HPI  Gastrointestinal ROS: negative  Genito-Urinary ROS: no dysuria, trouble voiding, or hematuria  Musculoskeletal ROS: negative  Neurological ROS: no TIA or stroke symptoms  Dermatological ROS: negative      Blood pressure 124/84, pulse 58, height 6' 4\" (1.93 m), weight 273 lb 12.8 oz (124.2 kg).        Physical Examination:    General appearance - alert, well appearing, and in no distress  HEENT- Pink conjunctiva  , Non-icteri sclera,PERRLA  Mental status - alert, oriented to person, place, and time  Neck - supple, no significant adenopathy, no JVD, or carotid bruits  Chest - clear to auscultation, no wheezes, rales or rhonchi, symmetric air entry  Heart - normal rate, regular rhythm, normal S1, S2, no murmurs, rubs, clicks or gallops  Abdomen - soft, nontender, nondistended, no masses or organomegaly  TERRY- no CVA or flank tenderness, no suprapubic tenderness  Neurological - alert, oriented, normal speech, no focal findings or movement disorder noted  Musculoskeletal/limbs - no joint tenderness, deformity or swelling   - peripheral pulses normal, no pedal edema, no clubbing or cyanosis  Skin - normal coloration and turgor, no rashes, no suspicious skin lesions noted  Psych- appropriate mood and affect    Lab  No results for input(s): CKTOTAL, CKMB, CKMBINDEX, TROPONINI in the last 72 hours.   CBC:   Lab Results   Component Value Date/Time    WBC 7.7 10/08/2020 12:26 PM    RBC 5.70 10/08/2020 12:26 PM    HGB 15.5 10/08/2020 12:26 PM    HCT 49.4 10/08/2020 12:26 PM    MCV 86.7 10/08/2020 12:26 PM    MCH 27.2 10/08/2020 12:26 PM    MCHC 31.4 10/08/2020 12:26 PM     10/08/2020 12:26 PM    MPV 10.8 10/08/2020 12:26 PM     BMP:    Lab Results   Component Value Date/Time     05/03/2022 12:00 AM    K 4.7 05/03/2022 12:00 AM    K 5.0 10/08/2020 12:26 PM     05/03/2022 12:00 AM    CO2 26 05/03/2022 12:00 AM    BUN 23 05/03/2022 12:00 AM    CREATININE 1.4 05/03/2022 12:00 AM    CALCIUM 9.9 05/03/2022 12:00 AM    LABGLOM 5.3 05/03/2022 12:00 AM    LABGLOM 68 10/08/2020 12:26 PM    GLUCOSE 133 05/03/2022 12:00 AM     Hepatic Function Panel:  No results found for: ALKPHOS, ALT, AST, PROT, BILITOT, BILIDIR, IBILI, LABALBU  Magnesium:    Lab Results   Component Value Date/Time    MG 2.1 05/03/2022 12:00 AM     Warfarin PT/INR:  No components found for: PTPATWAR, PTINRWAR  HgBA1c:  No results found for: LABA1C  FLP:  No results found for: TRIG, HDL, LDLCALC, LDLDIRECT, LABVLDL  TSH:  No results found for: TSH      ekg 10/5/2020  NSR, 2: 1 AV block, RBBB, RUTHANN, Bifascicular block, QRS duration 166 msec      Echo   Conclusions      Summary   Left Ventricular size is Mildly increased . Normal left ventricular wall thickness. Systolic function was normal.   There were no regional wall motion abnormalities. Ejection fraction is visually estimated at 60%. Doppler parameters were consistent with abnormal left ventricular   relaxation (grade 1 diastolic dysfunction). The left atrium is Mildly dilated. Signature      ----------------------------------------------------------------   Electronically signed by Marquez Valdovinos MD (Interpreting   physician) on 10/06/2020 at 06:03 PM   ----------------------------------------------------------------    Conclusions      Summary   Normal left ventricle size and lOW nORMAL LV systolic function. Ejection   fraction was estimated at 50 %. There were no regional left ventricular   wall motion abnormalities and wall thickness was within normal limits. Doppler parameters were consistent with abnormal left ventricular   relaxation (grade 1 diastolic dysfunction). Signature      ----------------------------------------------------------------   Electronically signed by Marquez Valdovinos MD (Interpreting   physician) on 10/21/2021 at 07:06 PM   ----------------------------------------------------------------     Conclusions      Summary   Lexiscan EKG stress test is not suggestive for ischemia. The nuclear images is not suggestive for myocardial ischemia.       Signatures      ----------------------------------------------------------------   Electronically signed by Marquez Valdovinos MD (Interpreting   Cardiologist) on 10/21/2021 at 18:06        Conclusions      Summary Lexiscan EKG stress test is non diagnostic for ischemia due to baseline   LBBB   Calculated gated LVEF 44 %. The nuclear images is not suggestive for myocardial ischemia. Signatures      ----------------------------------------------------------------   Electronically signed by Eileen Dasilva MD (Interpreting   Cardiologist) on 11/17/2020 at 18:47   ----------------------------------------------------------------    48 Hours  holter     DIARY *  Symptom correlates with Normal sinus rhythm     CONCLUSION:  *     Normal Sinus rhythm    Average Heart Rate74 BPM Range from 59 bpm to 141 bpm   Frequent Premature atrial complexes -2%  Frequent Premature ventricular complexes -3%  Occasional PACED RHYTHM  No long pause or profound bradycardia  No Supraventricular Tachycardia   No Atrial Fibrillation    Clinical correlation indicated        Confirmed by Francisco Nelson MD, Viktoria March ([id number]) on 11/29/2020 6:41:48 PM    ekg 10/11/21  Electronic ventricular pacemaker   Pacemaker ECG, No further analysis   INSUFFICIENT DATA    ekg 5/3/22  NSR with freq PVCs, pvcs in trigeminy    Assessment    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 Essential hypertension        2. Pure hypercholesterolemia        3. Abnormal EKG freq PVCs in trigeminy        4. S/P cardiac pacemaker procedure 10/2020        5. History of syncope            abn ekg with PVCs in trigemniy    Plan     THE  CURRENT meds and labs reviewed    Continue the current treatment and with constant vigilance to changes in symptoms and also any potential side effects. Return for care or seek medical attention immediately if symptoms got worse and/or develop new symptoms.     Svt in the recent pacer interrogation  abn ekg with PVCs in trigemniy  The presume low HR was due to pvcs  Has new dietary changes  BMP and mG WNKL  Cont toprol xl 25 po qd      Hx of SYMPTOMATIC bradyarrhythmia WITH 2ND DEGREE av BLOCK AND BIFASCICULAR BLOCK) AND RECENT HX OF SYNCOPE- APPEARS CARDIAC CAUSE AND INTERMITTENT DIZZINESS   Bifascicular block with 2:1 AV block- 2nd degree  VENTRICULAR RATE 44  Hx of dizziness  Hx of syncope  Had pacer  Pacer  Interrogation  April 2022. Battery 10.5 years     Presenting rhythm AP      A Impedance 475  RV Impedance 418     P wave sensing 3.8  R wave sensing 7.3     A Threshold 0.875 @ 0.40  A Amplitude 1.75 @ 0.40  RV Thresholds 0.50 @ 0.40  RV Amplitude 2.0 @ 0.40        A Paced 37.7%  V Paced 91.9%     Programmed Mode DDDR         Afib [place] 0%     Episodes   2/4/22-30 seconds SVT rate 154   no new action for nonsustained SVT for 30 sec  On Toprol xl       Sob on exertion on exertion wrose  On radial pulse a lots of pulse deficit  With pvcs  ABN  HOLter 48 hrs- FREQ PVC AND PACS  Echo and ex nuc-WNL    HX OF Postural dizziness for yrs  MUCH Better after pacer  Bedside orthostatic eval negative  32 oz a day  Advised to drink more     Hypertension, on medical treatment. Seems to be under good control. Patient is compliant with medical treatment. On lisinopril 5 mg po qd     Hyperlipidemia: on statins, followed periodically. Patient need periodic lipid and liver profile. pcp follow    D/w the pat AT LENGTH  the plan of care    Hx of factor Vleiden AND hX OF dvt  On xeralto    OVERALL FEEL GOOD AND MUCH BETTER AFTER PACER    Discussed use, benefit, and side effects of prescribed medications. All patient questions answered. Pt voiced understanding. Instructed to continue current medications, diet and exercise. Continue risk factor modification and medical management. Patient agreed with treatment plan. Follow up as directed.       RTC in 6 months      [facility]

## 2022-11-03 PROCEDURE — 93296 REM INTERROG EVL PM/IDS: CPT | Performed by: INTERNAL MEDICINE

## 2022-11-03 PROCEDURE — 93294 REM INTERROG EVL PM/LDLS PM: CPT | Performed by: INTERNAL MEDICINE

## 2022-11-04 ENCOUNTER — PROCEDURE VISIT (OUTPATIENT)
Dept: CARDIOLOGY CLINIC | Age: 67
End: 2022-11-04
Payer: COMMERCIAL

## 2022-11-04 DIAGNOSIS — Z95.0 S/P CARDIAC PACEMAKER PROCEDURE: Primary | ICD-10-CM

## 2022-11-04 NOTE — PROGRESS NOTES
[facility] Medtronic Dual Pacemaker   Patient of Mike    Battery 10.2 years    Presenting rhythm AS/AP     A Impedance 437  RV Impedance 475    P wave sensing 2.1  R wave sensing 11.6    A Threshold 0.625 @ 0.40  A Amplitude 1.5 @ 0.40  RV Thresholds 0.50 @ 0.40  RV Amplitude 2.0 @ 0.40      A Paced 75.1%  V Paced 78.4%    Programmed Mode DDDR       Afib [place] <0.1%    Episodes   10/7/22- 10 beats VT rate 154

## 2023-01-17 ENCOUNTER — NURSE ONLY (OUTPATIENT)
Dept: CARDIOLOGY CLINIC | Age: 68
End: 2023-01-17

## 2023-01-17 DIAGNOSIS — Z95.0 S/P CARDIAC PACEMAKER PROCEDURE: Primary | ICD-10-CM

## 2023-01-17 NOTE — PROGRESS NOTES
In Office Medtronic Dual Pacemaker - has CL alexey  Patient of [employer]    Battery 9.8 years    Presenting rhythm AP  with freq PVCs  Underlying dependent in ventricle at 30    A Impedance 456  RV Impedance 475    P wave sensing 2  R wave sensing -    A Threshold 0.75 @ 0.40  A Amplitude 1.50 @ 0.40  RV Thresholds 0.75 @ 0.40  RV Amplitude 2.0 @ 0.40      A Paced 64.3%  V Paced 84.1%    Programmed Mode DDDR       Afib [place] <0.1%    Episodes   none

## 2023-04-24 ENCOUNTER — TELEPHONE (OUTPATIENT)
Dept: CARDIOLOGY CLINIC | Age: 68
End: 2023-04-24

## 2023-04-27 ENCOUNTER — PROCEDURE VISIT (OUTPATIENT)
Dept: CARDIOLOGY CLINIC | Age: 68
End: 2023-04-27

## 2023-04-27 DIAGNOSIS — Z95.0 S/P CARDIAC PACEMAKER PROCEDURE: Primary | ICD-10-CM

## 2023-04-27 NOTE — PROGRESS NOTES
Dr Sanjuanita Davis pt   Medtronic dual pacer remote   Battery 9.7 yrs remaining  Known aflutter / xarelto   Afib burden 0%    Dddr     A paced 62.6%  V paced 81.7%      P waves 1.4  Rv waves 9.6  Atrial impedence 437  Vent impedence 456    Atrial threshold 0.625 @ 0.4  Vent threshold 0.625 @ 0  Atrial amplitude 1.5 @ 0.4  Vent amplitude 2 @ 0.4  3 episodes of ns vt

## 2023-05-05 ENCOUNTER — OFFICE VISIT (OUTPATIENT)
Dept: CARDIOLOGY CLINIC | Age: 68
End: 2023-05-05

## 2023-05-05 VITALS
BODY MASS INDEX: 33.51 KG/M2 | HEIGHT: 76 IN | SYSTOLIC BLOOD PRESSURE: 129 MMHG | WEIGHT: 275.2 LBS | DIASTOLIC BLOOD PRESSURE: 72 MMHG | HEART RATE: 80 BPM

## 2023-05-05 DIAGNOSIS — R42 POSTURAL DIZZINESS: ICD-10-CM

## 2023-05-05 DIAGNOSIS — E78.00 PURE HYPERCHOLESTEROLEMIA: ICD-10-CM

## 2023-05-05 DIAGNOSIS — R06.02 SOB (SHORTNESS OF BREATH) ON EXERTION: ICD-10-CM

## 2023-05-05 DIAGNOSIS — R94.31 ABNORMAL EKG: ICD-10-CM

## 2023-05-05 DIAGNOSIS — Z87.898 HISTORY OF SYNCOPE: ICD-10-CM

## 2023-05-05 DIAGNOSIS — I10 ESSENTIAL HYPERTENSION: Primary | ICD-10-CM

## 2023-05-05 DIAGNOSIS — Z95.0 S/P CARDIAC PACEMAKER PROCEDURE: ICD-10-CM

## 2023-07-28 ENCOUNTER — PROCEDURE VISIT (OUTPATIENT)
Dept: CARDIOLOGY CLINIC | Age: 68
End: 2023-07-28

## 2023-07-28 DIAGNOSIS — Z95.0 S/P CARDIAC PACEMAKER PROCEDURE: Primary | ICD-10-CM

## 2023-07-28 NOTE — PROGRESS NOTES
[employer] medtronic dual pacer     . Manan Dimas Battery longevity:  9.2 years on device   Presenting rhythm  AP     Atrial impedance 380  RV impedance 418    P wave sensing 1.6  R wave sensing 7.9    61.4 % atrial paced  80.6 % RV paced     Atrial threshold 0.625 V  at 0.4ms  RV threshold 0.625 V at 0.4ms  Mode switches   0

## 2023-09-12 ENCOUNTER — TELEPHONE (OUTPATIENT)
Dept: CARDIOLOGY CLINIC | Age: 68
End: 2023-09-12

## 2023-09-13 NOTE — TELEPHONE ENCOUNTER
Call to patient. Walked through manual download. [facility] Medtronic Dual Pacer    All numbers WNL. Programmed DDDR - lower rate 60    7/28/23-9/13/23 -- 113 single PVCs per hour      Call to Dr Bone  office. Made him aware device was working as it should.

## 2023-09-13 NOTE — TELEPHONE ENCOUNTER
We received another fax today stating that the patient's PCP, Dr. Marino Miramontes, is concerned about the patient's EKG and would like you to review it. Note and EKG scanned into media and attached to this encounter. Please advise.

## 2023-09-14 NOTE — TELEPHONE ENCOUNTER
I printed off the EKG that was scanned into the chart, a little difficult to see in printed form so referred back to device check. Presenting rhythm is: AS/VS, AS  which is appropriate sensing and pacing. Sensed R waves and paced R waves definitely do look different. Does this answer the question? Looks like [employer] [facility] office has [employer] but I cannot send this note to them? A different system?   LMOM to review this and to call if they have further questions

## 2023-09-22 NOTE — TELEPHONE ENCOUNTER
Ekg 9/12/23  AV sequential pacing  With frequent PVC and most Bigeminy and some trigeminy pattern  PVC count as pulse rate  but may not be able to be counted peripherally always  Should depend on cental heart rate due to Pulse deficit . Due to pulse deficit pacer interrogation was done and pacer is work as it is supposed to work  Thank you for calling to  Dr Lavinia Thompson office. Made him aware device was working as it should.   No new action  Make sure pat keep his appointment

## 2023-10-31 ENCOUNTER — TELEPHONE (OUTPATIENT)
Dept: CARDIOLOGY CLINIC | Age: 68
End: 2023-10-31

## 2023-10-31 ENCOUNTER — OFFICE VISIT (OUTPATIENT)
Dept: CARDIOLOGY CLINIC | Age: 68
End: 2023-10-31
Payer: MEDICARE

## 2023-10-31 VITALS
DIASTOLIC BLOOD PRESSURE: 69 MMHG | HEART RATE: 64 BPM | SYSTOLIC BLOOD PRESSURE: 124 MMHG | WEIGHT: 269 LBS | HEIGHT: 76 IN | BODY MASS INDEX: 32.76 KG/M2

## 2023-10-31 DIAGNOSIS — E78.00 PURE HYPERCHOLESTEROLEMIA: ICD-10-CM

## 2023-10-31 DIAGNOSIS — I10 ESSENTIAL HYPERTENSION: Primary | ICD-10-CM

## 2023-10-31 DIAGNOSIS — I45.2 RBBB (RIGHT BUNDLE BRANCH BLOCK WITH LEFT ANTERIOR FASCICULAR BLOCK): ICD-10-CM

## 2023-10-31 DIAGNOSIS — Z87.898 HISTORY OF SYNCOPE: ICD-10-CM

## 2023-10-31 DIAGNOSIS — R94.31 ABNORMAL EKG: ICD-10-CM

## 2023-10-31 DIAGNOSIS — Z95.0 S/P CARDIAC PACEMAKER PROCEDURE: ICD-10-CM

## 2023-10-31 DIAGNOSIS — R42 POSTURAL DIZZINESS: ICD-10-CM

## 2023-10-31 PROCEDURE — 3017F COLORECTAL CA SCREEN DOC REV: CPT | Performed by: INTERNAL MEDICINE

## 2023-10-31 PROCEDURE — 1123F ACP DISCUSS/DSCN MKR DOCD: CPT | Performed by: INTERNAL MEDICINE

## 2023-10-31 PROCEDURE — 99214 OFFICE O/P EST MOD 30 MIN: CPT | Performed by: INTERNAL MEDICINE

## 2023-10-31 PROCEDURE — G8427 DOCREV CUR MEDS BY ELIG CLIN: HCPCS | Performed by: INTERNAL MEDICINE

## 2023-10-31 PROCEDURE — 1036F TOBACCO NON-USER: CPT | Performed by: INTERNAL MEDICINE

## 2023-10-31 PROCEDURE — 3074F SYST BP LT 130 MM HG: CPT | Performed by: INTERNAL MEDICINE

## 2023-10-31 PROCEDURE — 3078F DIAST BP <80 MM HG: CPT | Performed by: INTERNAL MEDICINE

## 2023-10-31 PROCEDURE — G8417 CALC BMI ABV UP PARAM F/U: HCPCS | Performed by: INTERNAL MEDICINE

## 2023-10-31 PROCEDURE — G8484 FLU IMMUNIZE NO ADMIN: HCPCS | Performed by: INTERNAL MEDICINE

## 2023-10-31 RX ORDER — METOPROLOL SUCCINATE 25 MG/1
25 TABLET, EXTENDED RELEASE ORAL DAILY
Qty: 30 TABLET | Refills: 11 | Status: CANCELLED | OUTPATIENT
Start: 2023-10-31

## 2023-10-31 RX ORDER — METOPROLOL SUCCINATE 50 MG/1
50 TABLET, EXTENDED RELEASE ORAL DAILY
Qty: 90 TABLET | Refills: 3 | Status: SHIPPED | OUTPATIENT
Start: 2023-10-31

## 2023-10-31 NOTE — PROGRESS NOTES
attention immediately if symptoms got worse and/or develop new symptoms. Svt in the recent pacer interrogation  abn ekg with PVCs in trigemniy  The presume low HR was due to pvcs  Has new dietary changes  BMP and mG WNL  Increase  toprol xl 50 mg po qd- from 25  mg   Stop lisinipril 5 mg po qd      Hx of SYMPTOMATIC bradyarrhythmia WITH 2ND DEGREE av BLOCK AND BIFASCICULAR BLOCK) AND RECENT HX OF SYNCOPE- APPEARS CARDIAC CAUSE AND INTERMITTENT DIZZINESS   Bifascicular block with 2:1 AV block- 2nd degree  VENTRICULAR RATE 44  Hx of dizziness  Hx of syncope  Had pacer  Pacer  Interrogation  April 2023. Battery 1good  NSVT one episode noted   Afib [place] 0%     Episodes   2/4/22-30 seconds SVT rate 154   no new action for nonsustained SVT for 30 sec  On Toprol xl       Sob on exertion on exertion wrose  On radial pulse a lots of pulse deficit  With pvcs  ABN  HOLter 48 hrs- FREQ PVC AND PACS  Echo and ex nuc-WNL    HX OF Postural dizziness for yrs  MUCH Better after pacer  Bedside orthostatic eval negative  32 oz a day  Advised to drink more     Hypertension, on medical treatment. Seems to be under good control. Patient is compliant with medical treatment. Stop  lisinopril 5 mg po qd  Increase toprol xl 50 po qd     Hyperlipidemia: on statins, followed periodically. Patient need periodic lipid and liver profile. pcp follow    D/w the pat AT LENGTH  the plan of care    Hx of factor Vleiden AND hX OF dvt  On xeralto    OVERALL FEEL GOOD AND  stable AFTER PACER    Discussed use, benefit, and side effects of prescribed medications. All patient questions answered. Pt voiced understanding. Instructed to continue current medications, diet and exercise. Continue risk factor modification and medical management. Patient agreed with treatment plan. Follow up as directed. I have reviewed the provider's instructions with the patient, answering all questions to his satisfaction.     The patient is advised to attempt to

## 2023-11-03 ENCOUNTER — PROCEDURE VISIT (OUTPATIENT)
Dept: CARDIOLOGY CLINIC | Age: 68
End: 2023-11-03

## 2023-11-03 DIAGNOSIS — Z95.0 S/P CARDIAC PACEMAKER PROCEDURE: Primary | ICD-10-CM

## 2023-11-03 NOTE — PROGRESS NOTES
[facility] Medtronic Dual Pacemaker   Patient of Mike    Battery 9 years    Presenting rhythm APVP    A Impedance 399  RV Impedance 437    P wave sensing 2.8  R wave sensing 11.3    A Threshold 0.625 @ 0.40  A Amplitude 1.50 @ 0.40  RV Thresholds 0.5 @ 0.40  RV Amplitude 2.0 @ 0.40      A Paced 61.6%  V Paced 78.2%    Programmed Mode DDDR       Afib [place] <0.1%    Episodes   none

## 2024-01-23 ENCOUNTER — NURSE ONLY (OUTPATIENT)
Dept: CARDIOLOGY CLINIC | Age: 69
End: 2024-01-23
Payer: MEDICARE

## 2024-01-23 DIAGNOSIS — Z95.0 S/P CARDIAC PACEMAKER PROCEDURE: Primary | ICD-10-CM

## 2024-01-23 PROCEDURE — 93280 PM DEVICE PROGR EVAL DUAL: CPT | Performed by: INTERNAL MEDICINE

## 2024-01-23 NOTE — PROGRESS NOTES
In Office Medtronic Dual Pacemaker   Patient of Mike    Battery 8.9 years    Presenting rhythm AP   Underlying AS  dependent at 30    A Impedance 513  RV Impedance 475    P wave sensing 3  R wave sensing -    A Threshold 0.75 @ 0.40  A Amplitude 1.50 @ 0.40  RV Thresholds 0.75 @ 0.40  RV Amplitude 2.0 @ 0.40      A Paced 62.5%  V Paced 82.9%    Programmed Mode DDDR       Afib [place] <0.1%    Episodes   none

## 2024-04-16 ENCOUNTER — OFFICE VISIT (OUTPATIENT)
Dept: CARDIOLOGY CLINIC | Age: 69
End: 2024-04-16
Payer: MEDICARE

## 2024-04-16 VITALS
SYSTOLIC BLOOD PRESSURE: 144 MMHG | HEIGHT: 76 IN | BODY MASS INDEX: 33.22 KG/M2 | HEART RATE: 87 BPM | DIASTOLIC BLOOD PRESSURE: 84 MMHG | WEIGHT: 272.8 LBS

## 2024-04-16 DIAGNOSIS — R94.31 ABNORMAL EKG: ICD-10-CM

## 2024-04-16 DIAGNOSIS — Z95.0 S/P CARDIAC PACEMAKER PROCEDURE: ICD-10-CM

## 2024-04-16 DIAGNOSIS — E78.00 PURE HYPERCHOLESTEROLEMIA: ICD-10-CM

## 2024-04-16 DIAGNOSIS — I10 ESSENTIAL HYPERTENSION: Primary | ICD-10-CM

## 2024-04-16 DIAGNOSIS — I45.2 RBBB (RIGHT BUNDLE BRANCH BLOCK WITH LEFT ANTERIOR FASCICULAR BLOCK): ICD-10-CM

## 2024-04-16 PROBLEM — R42 POSTURAL DIZZINESS: Status: RESOLVED | Noted: 2020-10-05 | Resolved: 2024-04-16

## 2024-04-16 PROCEDURE — G8427 DOCREV CUR MEDS BY ELIG CLIN: HCPCS | Performed by: INTERNAL MEDICINE

## 2024-04-16 PROCEDURE — 3079F DIAST BP 80-89 MM HG: CPT | Performed by: INTERNAL MEDICINE

## 2024-04-16 PROCEDURE — G8417 CALC BMI ABV UP PARAM F/U: HCPCS | Performed by: INTERNAL MEDICINE

## 2024-04-16 PROCEDURE — 3077F SYST BP >= 140 MM HG: CPT | Performed by: INTERNAL MEDICINE

## 2024-04-16 PROCEDURE — 1036F TOBACCO NON-USER: CPT | Performed by: INTERNAL MEDICINE

## 2024-04-16 PROCEDURE — 99214 OFFICE O/P EST MOD 30 MIN: CPT | Performed by: INTERNAL MEDICINE

## 2024-04-16 PROCEDURE — 3017F COLORECTAL CA SCREEN DOC REV: CPT | Performed by: INTERNAL MEDICINE

## 2024-04-16 PROCEDURE — 1123F ACP DISCUSS/DSCN MKR DOCD: CPT | Performed by: INTERNAL MEDICINE

## 2024-04-16 RX ORDER — METFORMIN HYDROCHLORIDE 500 MG/1
500 TABLET, EXTENDED RELEASE ORAL
COMMUNITY
Start: 2023-10-19

## 2024-04-16 NOTE — PROGRESS NOTES
satisfaction.    The patient is advised to attempt to improve diet and exercise patterns to aid in medical management of this problem.  Advised more plant based nutrition/meditarrean diet   Advised patient to call office or seek immediate medical attention if there is any new onset of  any chest pain, sob, palpitations, lightheadedness, dizziness, orthopnea, PND or pedal edema.   All medication side effects were discussed in details.        RTC in 6 months      Daly Mckeon MD,MD,FACC

## 2024-04-17 RX ORDER — ATORVASTATIN CALCIUM 20 MG/1
20 TABLET, FILM COATED ORAL DAILY
Qty: 90 TABLET | Refills: 1 | Status: SHIPPED | OUTPATIENT
Start: 2024-04-17

## 2024-04-17 RX ORDER — METOPROLOL SUCCINATE 50 MG/1
50 TABLET, EXTENDED RELEASE ORAL DAILY
Qty: 90 TABLET | Refills: 1 | Status: SHIPPED | OUTPATIENT
Start: 2024-04-17

## 2024-05-01 ENCOUNTER — TELEPHONE (OUTPATIENT)
Dept: CARDIOLOGY CLINIC | Age: 69
End: 2024-05-01

## 2024-05-07 ENCOUNTER — TELEPHONE (OUTPATIENT)
Dept: CARDIOLOGY CLINIC | Age: 69
End: 2024-05-07

## 2024-05-08 ENCOUNTER — PROCEDURE VISIT (OUTPATIENT)
Dept: CARDIOLOGY CLINIC | Age: 69
End: 2024-05-08

## 2024-05-08 DIAGNOSIS — Z95.0 S/P CARDIAC PACEMAKER PROCEDURE: Primary | ICD-10-CM

## 2024-05-08 NOTE — PROGRESS NOTES
[employer] dual pacer     ..Battery longevity:  8.5 years on device   Presenting rhythm  AS  PVC's   4/20/24  12 beats VT     Atrial impedance 418  RV impedance 418    P wave sensing 1.9  R wave sensing 9.8    64.9 % atrial paced  88.2 % RV paced     Atrial threshold 0.75 V  at 0.4ms  RV threshold 0.625 V at 0.4ms  Mode switches   0

## 2024-08-21 ENCOUNTER — PROCEDURE VISIT (OUTPATIENT)
Dept: CARDIOLOGY CLINIC | Age: 69
End: 2024-08-21
Payer: MEDICARE

## 2024-08-21 DIAGNOSIS — Z95.0 S/P CARDIAC PACEMAKER PROCEDURE: Primary | ICD-10-CM

## 2024-08-21 PROCEDURE — 93294 REM INTERROG EVL PM/LDLS PM: CPT | Performed by: INTERNAL MEDICINE

## 2024-08-21 PROCEDURE — 93296 REM INTERROG EVL PM/IDS: CPT | Performed by: INTERNAL MEDICINE

## 2024-08-21 NOTE — PROGRESS NOTES
[facility] Medtronic Dual Pacemaker   Patient of Mike    Battery 8.3 years    Presenting rhythm AS AP /     A Impedance 418  RV Impedance 437    P wave sensing 2  R wave sensing 10.4    A Threshold 0.625 @ 0.4  A Amplitude 1.5 @ 0.4  RV Thresholds 0.5 @ 0.4  RV Amplitude 2 @ 0.4      A Paced 62.3%  V Paced 89%    Programmed Mode DDDR       Afib [place] <0.1%    Episodes  NS VT

## 2024-12-05 ENCOUNTER — OFFICE VISIT (OUTPATIENT)
Dept: CARDIOLOGY CLINIC | Age: 69
End: 2024-12-05
Payer: MEDICARE

## 2024-12-05 VITALS
HEIGHT: 76 IN | WEIGHT: 273.8 LBS | SYSTOLIC BLOOD PRESSURE: 113 MMHG | HEART RATE: 68 BPM | DIASTOLIC BLOOD PRESSURE: 73 MMHG | BODY MASS INDEX: 33.34 KG/M2

## 2024-12-05 DIAGNOSIS — Z95.0 S/P CARDIAC PACEMAKER PROCEDURE: ICD-10-CM

## 2024-12-05 DIAGNOSIS — R94.31 ABNORMAL EKG: ICD-10-CM

## 2024-12-05 DIAGNOSIS — I45.2 RBBB (RIGHT BUNDLE BRANCH BLOCK WITH LEFT ANTERIOR FASCICULAR BLOCK): ICD-10-CM

## 2024-12-05 DIAGNOSIS — I10 ESSENTIAL HYPERTENSION: Primary | ICD-10-CM

## 2024-12-05 DIAGNOSIS — Z87.898 HISTORY OF SYNCOPE: ICD-10-CM

## 2024-12-05 DIAGNOSIS — E78.00 PURE HYPERCHOLESTEROLEMIA: ICD-10-CM

## 2024-12-05 PROCEDURE — 99214 OFFICE O/P EST MOD 30 MIN: CPT | Performed by: INTERNAL MEDICINE

## 2024-12-05 PROCEDURE — 1036F TOBACCO NON-USER: CPT | Performed by: INTERNAL MEDICINE

## 2024-12-05 PROCEDURE — 1160F RVW MEDS BY RX/DR IN RCRD: CPT | Performed by: INTERNAL MEDICINE

## 2024-12-05 PROCEDURE — 3078F DIAST BP <80 MM HG: CPT | Performed by: INTERNAL MEDICINE

## 2024-12-05 PROCEDURE — 93000 ELECTROCARDIOGRAM COMPLETE: CPT | Performed by: INTERNAL MEDICINE

## 2024-12-05 PROCEDURE — 3074F SYST BP LT 130 MM HG: CPT | Performed by: INTERNAL MEDICINE

## 2024-12-05 PROCEDURE — G8417 CALC BMI ABV UP PARAM F/U: HCPCS | Performed by: INTERNAL MEDICINE

## 2024-12-05 PROCEDURE — 1159F MED LIST DOCD IN RCRD: CPT | Performed by: INTERNAL MEDICINE

## 2024-12-05 PROCEDURE — 1123F ACP DISCUSS/DSCN MKR DOCD: CPT | Performed by: INTERNAL MEDICINE

## 2024-12-05 PROCEDURE — 3017F COLORECTAL CA SCREEN DOC REV: CPT | Performed by: INTERNAL MEDICINE

## 2024-12-05 PROCEDURE — G8427 DOCREV CUR MEDS BY ELIG CLIN: HCPCS | Performed by: INTERNAL MEDICINE

## 2024-12-05 PROCEDURE — G8484 FLU IMMUNIZE NO ADMIN: HCPCS | Performed by: INTERNAL MEDICINE

## 2024-12-05 RX ORDER — METOPROLOL SUCCINATE 50 MG/1
50 TABLET, EXTENDED RELEASE ORAL DAILY
Qty: 90 TABLET | Refills: 3 | Status: SHIPPED | OUTPATIENT
Start: 2024-12-05

## 2024-12-05 RX ORDER — ATORVASTATIN CALCIUM 20 MG/1
20 TABLET, FILM COATED ORAL DAILY
Qty: 90 TABLET | Refills: 3 | Status: SHIPPED | OUTPATIENT
Start: 2024-12-05

## 2024-12-05 NOTE — PROGRESS NOTES
FEEL GOOD AND  stable AFTER PACER    Discussed use, benefit, and side effects of prescribed medications. All patient questions answered. Pt voiced understanding. Instructed to continue current medications, diet and exercise. Continue risk factor modification and medical management. Patient agreed with treatment plan. Follow up as directed.    I have reviewed the provider's instructions with the patient, answering all questions to his satisfaction.    The patient is advised to attempt to improve diet and exercise patterns to aid in medical management of this problem.  Advised more plant based nutrition/meditarrean diet   Advised patient to call office or seek immediate medical attention if there is any new onset of  any chest pain, sob, palpitations, lightheadedness, dizziness, orthopnea, PND or pedal edema.   All medication side effects were discussed in details.        RTC in 12 months      Daly Mckeon MD,MD,FACC

## 2024-12-05 NOTE — PATIENT INSTRUCTIONS
Your MA's Today:  Rusty  Your Provider for Today: Dr. Mckeon       You may receive a survey regarding the care you received during your visit.  Your input is valuable to us.  We encourage you to complete and return your survey.  We hope you will choose us in the future for your healthcare needs.

## 2025-01-28 ENCOUNTER — NURSE ONLY (OUTPATIENT)
Dept: CARDIOLOGY CLINIC | Age: 70
End: 2025-01-28

## 2025-01-28 DIAGNOSIS — Z95.0 S/P CARDIAC PACEMAKER PROCEDURE: Primary | ICD-10-CM

## 2025-02-27 ENCOUNTER — TELEPHONE (OUTPATIENT)
Dept: CARDIOLOGY CLINIC | Age: 70
End: 2025-02-27

## 2025-03-07 ENCOUNTER — TELEPHONE (OUTPATIENT)
Dept: CARDIOLOGY CLINIC | Age: 70
End: 2025-03-07

## 2025-03-28 PROCEDURE — 93296 REM INTERROG EVL PM/IDS: CPT | Performed by: INTERNAL MEDICINE

## 2025-03-28 PROCEDURE — 93294 REM INTERROG EVL PM/LDLS PM: CPT | Performed by: INTERNAL MEDICINE
